# Patient Record
Sex: MALE | Race: WHITE | NOT HISPANIC OR LATINO | Employment: OTHER | URBAN - METROPOLITAN AREA
[De-identification: names, ages, dates, MRNs, and addresses within clinical notes are randomized per-mention and may not be internally consistent; named-entity substitution may affect disease eponyms.]

---

## 2019-01-09 ENCOUNTER — HOSPITAL ENCOUNTER (EMERGENCY)
Facility: HOSPITAL | Age: 44
Discharge: HOME | End: 2019-01-09
Attending: INTERNAL MEDICINE
Payer: COMMERCIAL

## 2019-01-09 VITALS
OXYGEN SATURATION: 96 % | RESPIRATION RATE: 20 BRPM | TEMPERATURE: 98 F | WEIGHT: 170 LBS | DIASTOLIC BLOOD PRESSURE: 79 MMHG | SYSTOLIC BLOOD PRESSURE: 140 MMHG | HEIGHT: 65 IN | HEART RATE: 91 BPM | BODY MASS INDEX: 28.32 KG/M2

## 2019-01-09 DIAGNOSIS — T14.90XA TRAUMA: ICD-10-CM

## 2019-01-09 DIAGNOSIS — T07.XXXA MULTIPLE SPRAINS: ICD-10-CM

## 2019-01-09 DIAGNOSIS — T07.XXXA MULTIPLE CONTUSIONS: Primary | ICD-10-CM

## 2019-01-09 PROCEDURE — 25000003 PHARM REV CODE 250: Performed by: INTERNAL MEDICINE

## 2019-01-09 PROCEDURE — 73080 X-RAY EXAM OF ELBOW: CPT | Mod: TC,FY,RT

## 2019-01-09 PROCEDURE — 99284 EMERGENCY DEPT VISIT MOD MDM: CPT | Mod: 25

## 2019-01-09 PROCEDURE — 73030 X-RAY EXAM OF SHOULDER: CPT | Mod: TC,FY,RT

## 2019-01-09 PROCEDURE — 73130 XR HAND COMPLETE 3 VIEW RIGHT: ICD-10-PCS | Mod: 26,RT,, | Performed by: RADIOLOGY

## 2019-01-09 PROCEDURE — 73130 X-RAY EXAM OF HAND: CPT | Mod: TC,FY,RT

## 2019-01-09 PROCEDURE — 73080 XR ELBOW COMPLETE 3 VIEW RIGHT: ICD-10-PCS | Mod: 26,RT,, | Performed by: RADIOLOGY

## 2019-01-09 PROCEDURE — 63600175 PHARM REV CODE 636 W HCPCS: Performed by: INTERNAL MEDICINE

## 2019-01-09 PROCEDURE — 72040 X-RAY EXAM NECK SPINE 2-3 VW: CPT | Mod: TC,FY

## 2019-01-09 PROCEDURE — 73030 X-RAY EXAM OF SHOULDER: CPT | Mod: 26,RT,, | Performed by: RADIOLOGY

## 2019-01-09 PROCEDURE — 72040 X-RAY EXAM NECK SPINE 2-3 VW: CPT | Mod: 26,,, | Performed by: RADIOLOGY

## 2019-01-09 PROCEDURE — 73130 X-RAY EXAM OF HAND: CPT | Mod: 26,RT,, | Performed by: RADIOLOGY

## 2019-01-09 PROCEDURE — 72040 XR CERVICAL SPINE AP LATERAL: ICD-10-PCS | Mod: 26,,, | Performed by: RADIOLOGY

## 2019-01-09 PROCEDURE — 71046 X-RAY EXAM CHEST 2 VIEWS: CPT | Mod: TC,FY

## 2019-01-09 PROCEDURE — 96372 THER/PROPH/DIAG INJ SC/IM: CPT

## 2019-01-09 PROCEDURE — 73030 XR SHOULDER TRAUMA 3 VIEW RIGHT: ICD-10-PCS | Mod: 26,RT,, | Performed by: RADIOLOGY

## 2019-01-09 PROCEDURE — 71046 X-RAY EXAM CHEST 2 VIEWS: CPT | Mod: 26,,, | Performed by: RADIOLOGY

## 2019-01-09 PROCEDURE — 71046 XR CHEST PA AND LATERAL: ICD-10-PCS | Mod: 26,,, | Performed by: RADIOLOGY

## 2019-01-09 PROCEDURE — 73080 X-RAY EXAM OF ELBOW: CPT | Mod: 26,RT,, | Performed by: RADIOLOGY

## 2019-01-09 RX ORDER — HYDROCODONE BITARTRATE AND ACETAMINOPHEN 10; 325 MG/1; MG/1
1 TABLET ORAL
Status: COMPLETED | OUTPATIENT
Start: 2019-01-09 | End: 2019-01-09

## 2019-01-09 RX ORDER — TIZANIDINE 4 MG/1
4 TABLET ORAL EVERY 6 HOURS PRN
Qty: 30 TABLET | Refills: 1 | Status: SHIPPED | OUTPATIENT
Start: 2019-01-09 | End: 2019-01-19

## 2019-01-09 RX ORDER — KETOROLAC TROMETHAMINE 30 MG/ML
60 INJECTION, SOLUTION INTRAMUSCULAR; INTRAVENOUS
Status: COMPLETED | OUTPATIENT
Start: 2019-01-09 | End: 2019-01-09

## 2019-01-09 RX ORDER — NAPROXEN 500 MG/1
500 TABLET ORAL 2 TIMES DAILY WITH MEALS
Qty: 60 TABLET | Refills: 0 | Status: SHIPPED | OUTPATIENT
Start: 2019-01-09 | End: 2019-03-28

## 2019-01-09 RX ADMIN — HYDROCODONE BITARTRATE AND ACETAMINOPHEN 1 TABLET: 10; 325 TABLET ORAL at 10:01

## 2019-01-09 RX ADMIN — KETOROLAC TROMETHAMINE 60 MG: 30 INJECTION, SOLUTION INTRAMUSCULAR at 10:01
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
## 2019-03-28 ENCOUNTER — ANESTHESIA (OUTPATIENT)
Dept: SURGERY | Facility: HOSPITAL | Age: 44
DRG: 854 | End: 2019-03-28

## 2019-03-28 ENCOUNTER — ANESTHESIA EVENT (OUTPATIENT)
Dept: SURGERY | Facility: HOSPITAL | Age: 44
DRG: 854 | End: 2019-03-28

## 2019-03-28 ENCOUNTER — HOSPITAL ENCOUNTER (EMERGENCY)
Facility: HOSPITAL | Age: 44
Discharge: ANOTHER HOSPITAL | End: 2019-03-28
Attending: FAMILY MEDICINE

## 2019-03-28 ENCOUNTER — HOSPITAL ENCOUNTER (INPATIENT)
Facility: HOSPITAL | Age: 44
LOS: 2 days | Discharge: LEFT BEFORE DISCHARGE | DRG: 854 | End: 2019-03-30
Attending: INTERNAL MEDICINE | Admitting: INTERNAL MEDICINE

## 2019-03-28 VITALS
OXYGEN SATURATION: 100 % | WEIGHT: 150 LBS | RESPIRATION RATE: 18 BRPM | SYSTOLIC BLOOD PRESSURE: 125 MMHG | BODY MASS INDEX: 28.32 KG/M2 | HEART RATE: 99 BPM | DIASTOLIC BLOOD PRESSURE: 79 MMHG | HEIGHT: 61 IN | TEMPERATURE: 101 F

## 2019-03-28 DIAGNOSIS — N20.1 URETERAL STONE: ICD-10-CM

## 2019-03-28 DIAGNOSIS — N13.2 URETERAL STONE WITH HYDRONEPHROSIS: Primary | ICD-10-CM

## 2019-03-28 DIAGNOSIS — A41.9 SEPSIS: ICD-10-CM

## 2019-03-28 DIAGNOSIS — N10 PYELONEPHRITIS, ACUTE: ICD-10-CM

## 2019-03-28 PROBLEM — F17.200 NICOTINE DEPENDENCE: Status: ACTIVE | Noted: 2019-03-28

## 2019-03-28 PROBLEM — E86.0 DEHYDRATION: Status: ACTIVE | Noted: 2019-03-28

## 2019-03-28 PROBLEM — E87.1 HYPONATREMIA: Status: ACTIVE | Noted: 2019-03-28

## 2019-03-28 PROBLEM — N12 PYELONEPHRITIS: Status: ACTIVE | Noted: 2019-03-28

## 2019-03-28 LAB
ALBUMIN SERPL BCP-MCNC: 3.9 G/DL (ref 3.5–5.2)
ALP SERPL-CCNC: 91 U/L (ref 55–135)
ALT SERPL W/O P-5'-P-CCNC: 38 U/L (ref 10–44)
AMPHET+METHAMPHET UR QL: NORMAL
ANION GAP SERPL CALC-SCNC: 11 MMOL/L (ref 8–16)
AST SERPL-CCNC: 35 U/L (ref 10–40)
BACTERIA #/AREA URNS HPF: ABNORMAL /HPF
BARBITURATES UR QL SCN>200 NG/ML: NEGATIVE
BASOPHILS # BLD AUTO: 0.04 K/UL (ref 0–0.2)
BASOPHILS NFR BLD: 0.2 % (ref 0–1.9)
BENZODIAZ UR QL SCN>200 NG/ML: NEGATIVE
BILIRUB SERPL-MCNC: 1.3 MG/DL (ref 0.1–1)
BILIRUB UR QL STRIP: ABNORMAL
BUN SERPL-MCNC: 11 MG/DL (ref 6–20)
BZE UR QL SCN: NEGATIVE
CALCIUM SERPL-MCNC: 9 MG/DL (ref 8.7–10.5)
CANNABINOIDS UR QL SCN: NORMAL
CHLORIDE SERPL-SCNC: 98 MMOL/L (ref 95–110)
CLARITY UR: ABNORMAL
CO2 SERPL-SCNC: 22 MMOL/L (ref 23–29)
COLOR UR: YELLOW
CREAT SERPL-MCNC: 1.4 MG/DL (ref 0.5–1.4)
CREAT UR-MCNC: 292 MG/DL (ref 23–375)
DEPRECATED S PYO AG THROAT QL EIA: NEGATIVE
DIFFERENTIAL METHOD: ABNORMAL
EOSINOPHIL # BLD AUTO: 0 K/UL (ref 0–0.5)
EOSINOPHIL NFR BLD: 0.2 % (ref 0–8)
ERYTHROCYTE [DISTWIDTH] IN BLOOD BY AUTOMATED COUNT: 13.4 % (ref 11.5–14.5)
EST. GFR  (AFRICAN AMERICAN): >60 ML/MIN/1.73 M^2
EST. GFR  (NON AFRICAN AMERICAN): >60 ML/MIN/1.73 M^2
GLUCOSE SERPL-MCNC: 113 MG/DL (ref 70–110)
GLUCOSE UR QL STRIP: NEGATIVE
HCT VFR BLD AUTO: 47.3 % (ref 40–54)
HGB BLD-MCNC: 15.6 G/DL (ref 14–18)
HGB UR QL STRIP: ABNORMAL
HYALINE CASTS #/AREA URNS LPF: 0 /LPF
IMM GRANULOCYTES # BLD AUTO: 0.13 K/UL (ref 0–0.04)
IMM GRANULOCYTES NFR BLD AUTO: 0.8 % (ref 0–0.5)
INFLUENZA A, MOLECULAR: NEGATIVE
INFLUENZA B, MOLECULAR: NEGATIVE
KETONES UR QL STRIP: ABNORMAL
LACTATE SERPL-SCNC: 0.7 MMOL/L (ref 0.5–2.2)
LEUKOCYTE ESTERASE UR QL STRIP: ABNORMAL
LYMPHOCYTES # BLD AUTO: 1.6 K/UL (ref 1–4.8)
LYMPHOCYTES NFR BLD: 9.7 % (ref 18–48)
MCH RBC QN AUTO: 31 PG (ref 27–31)
MCHC RBC AUTO-ENTMCNC: 33 G/DL (ref 32–36)
MCV RBC AUTO: 94 FL (ref 82–98)
MICROSCOPIC COMMENT: ABNORMAL
MONOCYTES # BLD AUTO: 0.9 K/UL (ref 0.3–1)
MONOCYTES NFR BLD: 5.7 % (ref 4–15)
NEUTROPHILS # BLD AUTO: 13.7 K/UL (ref 1.8–7.7)
NEUTROPHILS NFR BLD: 83.4 % (ref 38–73)
NITRITE UR QL STRIP: POSITIVE
NRBC BLD-RTO: 0 /100 WBC
OPIATES UR QL SCN: NEGATIVE
PCP UR QL SCN>25 NG/ML: NEGATIVE
PH UR STRIP: 6 [PH] (ref 5–8)
PLATELET # BLD AUTO: 239 K/UL (ref 150–350)
PMV BLD AUTO: 9.1 FL (ref 9.2–12.9)
POTASSIUM SERPL-SCNC: 4.2 MMOL/L (ref 3.5–5.1)
PROT SERPL-MCNC: 7.7 G/DL (ref 6–8.4)
PROT UR QL STRIP: ABNORMAL
RBC # BLD AUTO: 5.03 M/UL (ref 4.6–6.2)
RBC #/AREA URNS HPF: 20 /HPF (ref 0–4)
SODIUM SERPL-SCNC: 131 MMOL/L (ref 136–145)
SP GR UR STRIP: >=1.03 (ref 1–1.03)
SPECIMEN SOURCE: NORMAL
TOXICOLOGY INFORMATION: NORMAL
URN SPEC COLLECT METH UR: ABNORMAL
UROBILINOGEN UR STRIP-ACNC: >=8 EU/DL
WBC # BLD AUTO: 16.37 K/UL (ref 3.9–12.7)
WBC #/AREA URNS HPF: 50 /HPF (ref 0–5)

## 2019-03-28 PROCEDURE — D9220A PRA ANESTHESIA: ICD-10-PCS | Mod: ,,, | Performed by: ANESTHESIOLOGY

## 2019-03-28 PROCEDURE — 71000033 HC RECOVERY, INTIAL HOUR: Performed by: UROLOGY

## 2019-03-28 PROCEDURE — 63600175 PHARM REV CODE 636 W HCPCS: Performed by: INTERNAL MEDICINE

## 2019-03-28 PROCEDURE — 63600175 PHARM REV CODE 636 W HCPCS: Performed by: NURSE PRACTITIONER

## 2019-03-28 PROCEDURE — 37000009 HC ANESTHESIA EA ADD 15 MINS: Performed by: UROLOGY

## 2019-03-28 PROCEDURE — 63600175 PHARM REV CODE 636 W HCPCS: Performed by: FAMILY MEDICINE

## 2019-03-28 PROCEDURE — 87880 STREP A ASSAY W/OPTIC: CPT

## 2019-03-28 PROCEDURE — 96374 THER/PROPH/DIAG INJ IV PUSH: CPT | Performed by: INTERNAL MEDICINE

## 2019-03-28 PROCEDURE — 96365 THER/PROPH/DIAG IV INF INIT: CPT

## 2019-03-28 PROCEDURE — 76000 FLUOROSCOPY <1 HR PHYS/QHP: CPT | Mod: 26,59,, | Performed by: UROLOGY

## 2019-03-28 PROCEDURE — 99255 PR INITIAL INPATIENT CONSULT,LEVL V: ICD-10-PCS | Mod: 25,,, | Performed by: UROLOGY

## 2019-03-28 PROCEDURE — 52332 PR CYSTOSCOPY,INSERT URETERAL STENT: ICD-10-PCS | Mod: LT,,, | Performed by: UROLOGY

## 2019-03-28 PROCEDURE — 87088 URINE BACTERIA CULTURE: CPT

## 2019-03-28 PROCEDURE — 63600175 PHARM REV CODE 636 W HCPCS: Performed by: NURSE ANESTHETIST, CERTIFIED REGISTERED

## 2019-03-28 PROCEDURE — 74176 CT RENAL STONE STUDY ABD PELVIS WO: ICD-10-PCS | Mod: 26,,, | Performed by: RADIOLOGY

## 2019-03-28 PROCEDURE — 87502 INFLUENZA DNA AMP PROBE: CPT

## 2019-03-28 PROCEDURE — 87040 BLOOD CULTURE FOR BACTERIA: CPT

## 2019-03-28 PROCEDURE — 74420 UROGRAPHY RTRGR +-KUB: CPT | Mod: 26,,, | Performed by: UROLOGY

## 2019-03-28 PROCEDURE — 87186 SC STD MICRODIL/AGAR DIL: CPT

## 2019-03-28 PROCEDURE — 99285 EMERGENCY DEPT VISIT HI MDM: CPT | Mod: 25

## 2019-03-28 PROCEDURE — 36000706: Performed by: UROLOGY

## 2019-03-28 PROCEDURE — 25000003 PHARM REV CODE 250: Performed by: NURSE PRACTITIONER

## 2019-03-28 PROCEDURE — 71000039 HC RECOVERY, EACH ADD'L HOUR: Performed by: UROLOGY

## 2019-03-28 PROCEDURE — 36415 COLL VENOUS BLD VENIPUNCTURE: CPT

## 2019-03-28 PROCEDURE — 76000 PR  FLUOROSCOPE EXAMINATION: ICD-10-PCS | Mod: 26,59,, | Performed by: UROLOGY

## 2019-03-28 PROCEDURE — 80307 DRUG TEST PRSMV CHEM ANLYZR: CPT

## 2019-03-28 PROCEDURE — 85025 COMPLETE CBC W/AUTO DIFF WBC: CPT

## 2019-03-28 PROCEDURE — 74176 CT ABD & PELVIS W/O CONTRAST: CPT | Mod: 26,,, | Performed by: RADIOLOGY

## 2019-03-28 PROCEDURE — C1758 CATHETER, URETERAL: HCPCS | Performed by: UROLOGY

## 2019-03-28 PROCEDURE — 25000003 PHARM REV CODE 250: Performed by: FAMILY MEDICINE

## 2019-03-28 PROCEDURE — 27200651 HC AIRWAY, LMA: Performed by: NURSE ANESTHETIST, CERTIFIED REGISTERED

## 2019-03-28 PROCEDURE — C1769 GUIDE WIRE: HCPCS | Performed by: UROLOGY

## 2019-03-28 PROCEDURE — 11000001 HC ACUTE MED/SURG PRIVATE ROOM

## 2019-03-28 PROCEDURE — 80053 COMPREHEN METABOLIC PANEL: CPT

## 2019-03-28 PROCEDURE — 63600175 PHARM REV CODE 636 W HCPCS: Performed by: UROLOGY

## 2019-03-28 PROCEDURE — 36000707: Performed by: UROLOGY

## 2019-03-28 PROCEDURE — 25000003 PHARM REV CODE 250: Performed by: UROLOGY

## 2019-03-28 PROCEDURE — 87081 CULTURE SCREEN ONLY: CPT

## 2019-03-28 PROCEDURE — 96376 TX/PRO/DX INJ SAME DRUG ADON: CPT

## 2019-03-28 PROCEDURE — D9220A PRA ANESTHESIA: Mod: ,,, | Performed by: ANESTHESIOLOGY

## 2019-03-28 PROCEDURE — 96366 THER/PROPH/DIAG IV INF ADDON: CPT

## 2019-03-28 PROCEDURE — 25500020 PHARM REV CODE 255: Performed by: UROLOGY

## 2019-03-28 PROCEDURE — 96375 TX/PRO/DX INJ NEW DRUG ADDON: CPT

## 2019-03-28 PROCEDURE — 52332 CYSTOSCOPY AND TREATMENT: CPT | Mod: LT,,, | Performed by: UROLOGY

## 2019-03-28 PROCEDURE — 25000003 PHARM REV CODE 250: Performed by: NURSE ANESTHETIST, CERTIFIED REGISTERED

## 2019-03-28 PROCEDURE — 37000008 HC ANESTHESIA 1ST 15 MINUTES: Performed by: UROLOGY

## 2019-03-28 PROCEDURE — 96361 HYDRATE IV INFUSION ADD-ON: CPT

## 2019-03-28 PROCEDURE — 87077 CULTURE AEROBIC IDENTIFY: CPT

## 2019-03-28 PROCEDURE — 99900103 DSU ONLY-NO CHARGE-INITIAL HR (STAT): Performed by: UROLOGY

## 2019-03-28 PROCEDURE — 87086 URINE CULTURE/COLONY COUNT: CPT

## 2019-03-28 PROCEDURE — 25000242 PHARM REV CODE 250 ALT 637 W/ HCPCS: Performed by: NURSE ANESTHETIST, CERTIFIED REGISTERED

## 2019-03-28 PROCEDURE — 81000 URINALYSIS NONAUTO W/SCOPE: CPT | Mod: 59

## 2019-03-28 PROCEDURE — 74420 PR  X-RAY RETROGRADE PYELOGRAM: ICD-10-PCS | Mod: 26,,, | Performed by: UROLOGY

## 2019-03-28 PROCEDURE — 63600175 PHARM REV CODE 636 W HCPCS: Performed by: ANESTHESIOLOGY

## 2019-03-28 PROCEDURE — C2617 STENT, NON-COR, TEM W/O DEL: HCPCS | Performed by: UROLOGY

## 2019-03-28 PROCEDURE — 99255 IP/OBS CONSLTJ NEW/EST HI 80: CPT | Mod: 25,,, | Performed by: UROLOGY

## 2019-03-28 PROCEDURE — 83605 ASSAY OF LACTIC ACID: CPT

## 2019-03-28 PROCEDURE — 74176 CT ABD & PELVIS W/O CONTRAST: CPT | Mod: TC

## 2019-03-28 DEVICE — STENT SET URETERAL 6X24CM
Type: IMPLANTABLE DEVICE | Site: URETER | Status: NON-FUNCTIONAL
Removed: 2019-04-25

## 2019-03-28 RX ORDER — ONDANSETRON 2 MG/ML
INJECTION INTRAMUSCULAR; INTRAVENOUS
Status: DISCONTINUED | OUTPATIENT
Start: 2019-03-28 | End: 2019-03-28

## 2019-03-28 RX ORDER — GLYCOPYRROLATE 0.2 MG/ML
INJECTION INTRAMUSCULAR; INTRAVENOUS
Status: DISCONTINUED | OUTPATIENT
Start: 2019-03-28 | End: 2019-03-28

## 2019-03-28 RX ORDER — SODIUM CHLORIDE 9 MG/ML
INJECTION, SOLUTION INTRAVENOUS CONTINUOUS
Status: DISCONTINUED | OUTPATIENT
Start: 2019-03-28 | End: 2019-03-30 | Stop reason: HOSPADM

## 2019-03-28 RX ORDER — LEVOFLOXACIN 5 MG/ML
750 INJECTION, SOLUTION INTRAVENOUS
Status: DISCONTINUED | OUTPATIENT
Start: 2019-03-28 | End: 2019-03-28

## 2019-03-28 RX ORDER — RAMELTEON 8 MG/1
8 TABLET ORAL NIGHTLY PRN
Status: DISCONTINUED | OUTPATIENT
Start: 2019-03-28 | End: 2019-03-30 | Stop reason: HOSPADM

## 2019-03-28 RX ORDER — LIDOCAINE HCL/PF 100 MG/5ML
SYRINGE (ML) INTRAVENOUS
Status: DISCONTINUED | OUTPATIENT
Start: 2019-03-28 | End: 2019-03-28

## 2019-03-28 RX ORDER — LORAZEPAM 2 MG/ML
0.5 INJECTION INTRAMUSCULAR
Status: DISCONTINUED | OUTPATIENT
Start: 2019-03-28 | End: 2019-03-28

## 2019-03-28 RX ORDER — SODIUM CHLORIDE 0.9 % (FLUSH) 0.9 %
10 SYRINGE (ML) INJECTION
Status: DISCONTINUED | OUTPATIENT
Start: 2019-03-28 | End: 2019-03-30 | Stop reason: HOSPADM

## 2019-03-28 RX ORDER — ACETAMINOPHEN 500 MG
1000 TABLET ORAL EVERY 6 HOURS PRN
Status: DISCONTINUED | OUTPATIENT
Start: 2019-03-28 | End: 2019-03-30 | Stop reason: HOSPADM

## 2019-03-28 RX ORDER — POLYETHYLENE GLYCOL 3350 17 G/17G
17 POWDER, FOR SOLUTION ORAL 2 TIMES DAILY PRN
Status: DISCONTINUED | OUTPATIENT
Start: 2019-03-28 | End: 2019-03-30 | Stop reason: HOSPADM

## 2019-03-28 RX ORDER — HYDROMORPHONE HYDROCHLORIDE 2 MG/ML
0.5 INJECTION, SOLUTION INTRAMUSCULAR; INTRAVENOUS; SUBCUTANEOUS
Status: COMPLETED | OUTPATIENT
Start: 2019-03-28 | End: 2019-03-28

## 2019-03-28 RX ORDER — HYDROMORPHONE HYDROCHLORIDE 2 MG/ML
1 INJECTION, SOLUTION INTRAMUSCULAR; INTRAVENOUS; SUBCUTANEOUS
Status: COMPLETED | OUTPATIENT
Start: 2019-03-28 | End: 2019-03-28

## 2019-03-28 RX ORDER — ACETAMINOPHEN 10 MG/ML
1000 INJECTION, SOLUTION INTRAVENOUS ONCE
Status: COMPLETED | OUTPATIENT
Start: 2019-03-28 | End: 2019-03-28

## 2019-03-28 RX ORDER — SODIUM CHLORIDE, SODIUM LACTATE, POTASSIUM CHLORIDE, CALCIUM CHLORIDE 600; 310; 30; 20 MG/100ML; MG/100ML; MG/100ML; MG/100ML
INJECTION, SOLUTION INTRAVENOUS CONTINUOUS
Status: DISCONTINUED | OUTPATIENT
Start: 2019-03-28 | End: 2019-03-29

## 2019-03-28 RX ORDER — ONDANSETRON 2 MG/ML
4 INJECTION INTRAMUSCULAR; INTRAVENOUS
Status: COMPLETED | OUTPATIENT
Start: 2019-03-28 | End: 2019-03-28

## 2019-03-28 RX ORDER — FENTANYL CITRATE 50 UG/ML
INJECTION, SOLUTION INTRAMUSCULAR; INTRAVENOUS
Status: DISCONTINUED | OUTPATIENT
Start: 2019-03-28 | End: 2019-03-28

## 2019-03-28 RX ORDER — KETAMINE HYDROCHLORIDE 100 MG/ML
INJECTION, SOLUTION INTRAMUSCULAR; INTRAVENOUS
Status: DISCONTINUED | OUTPATIENT
Start: 2019-03-28 | End: 2019-03-28

## 2019-03-28 RX ORDER — ACETAMINOPHEN 10 MG/ML
1000 INJECTION, SOLUTION INTRAVENOUS ONCE
Status: DISCONTINUED | OUTPATIENT
Start: 2019-03-28 | End: 2019-03-28

## 2019-03-28 RX ORDER — KETOROLAC TROMETHAMINE 30 MG/ML
15 INJECTION, SOLUTION INTRAMUSCULAR; INTRAVENOUS EVERY 6 HOURS PRN
Status: DISCONTINUED | OUTPATIENT
Start: 2019-03-28 | End: 2019-03-30 | Stop reason: HOSPADM

## 2019-03-28 RX ORDER — MEROPENEM AND SODIUM CHLORIDE 1 G/50ML
1 INJECTION, SOLUTION INTRAVENOUS
Status: COMPLETED | OUTPATIENT
Start: 2019-03-28 | End: 2019-03-28

## 2019-03-28 RX ORDER — TAMSULOSIN HYDROCHLORIDE 0.4 MG/1
0.4 CAPSULE ORAL NIGHTLY
Status: DISCONTINUED | OUTPATIENT
Start: 2019-03-28 | End: 2019-03-30 | Stop reason: HOSPADM

## 2019-03-28 RX ORDER — PROPOFOL 10 MG/ML
VIAL (ML) INTRAVENOUS
Status: DISCONTINUED | OUTPATIENT
Start: 2019-03-28 | End: 2019-03-28

## 2019-03-28 RX ORDER — ACETAMINOPHEN 650 MG/1
650 SUPPOSITORY RECTAL
Status: DISCONTINUED | OUTPATIENT
Start: 2019-03-28 | End: 2019-03-28 | Stop reason: HOSPADM

## 2019-03-28 RX ORDER — FENTANYL CITRATE 50 UG/ML
50 INJECTION, SOLUTION INTRAMUSCULAR; INTRAVENOUS EVERY 5 MIN PRN
Status: DISCONTINUED | OUTPATIENT
Start: 2019-03-28 | End: 2019-03-30 | Stop reason: HOSPADM

## 2019-03-28 RX ORDER — DEXAMETHASONE SODIUM PHOSPHATE 4 MG/ML
INJECTION, SOLUTION INTRA-ARTICULAR; INTRALESIONAL; INTRAMUSCULAR; INTRAVENOUS; SOFT TISSUE
Status: DISCONTINUED | OUTPATIENT
Start: 2019-03-28 | End: 2019-03-28

## 2019-03-28 RX ORDER — ENOXAPARIN SODIUM 100 MG/ML
40 INJECTION SUBCUTANEOUS EVERY 24 HOURS
Status: DISCONTINUED | OUTPATIENT
Start: 2019-03-28 | End: 2019-03-29

## 2019-03-28 RX ORDER — ONDANSETRON 2 MG/ML
4 INJECTION INTRAMUSCULAR; INTRAVENOUS EVERY 4 HOURS PRN
Status: DISCONTINUED | OUTPATIENT
Start: 2019-03-28 | End: 2019-03-30 | Stop reason: HOSPADM

## 2019-03-28 RX ORDER — VANCOMYCIN HCL IN 5 % DEXTROSE 1G/250ML
1000 PLASTIC BAG, INJECTION (ML) INTRAVENOUS
Status: DISCONTINUED | OUTPATIENT
Start: 2019-03-29 | End: 2019-03-29

## 2019-03-28 RX ORDER — MIDAZOLAM HYDROCHLORIDE 1 MG/ML
INJECTION, SOLUTION INTRAMUSCULAR; INTRAVENOUS
Status: DISCONTINUED | OUTPATIENT
Start: 2019-03-28 | End: 2019-03-28

## 2019-03-28 RX ORDER — HYDROMORPHONE HYDROCHLORIDE 2 MG/ML
0.2 INJECTION, SOLUTION INTRAMUSCULAR; INTRAVENOUS; SUBCUTANEOUS EVERY 5 MIN PRN
Status: DISCONTINUED | OUTPATIENT
Start: 2019-03-28 | End: 2019-03-28

## 2019-03-28 RX ORDER — SODIUM CHLORIDE, SODIUM LACTATE, POTASSIUM CHLORIDE, CALCIUM CHLORIDE 600; 310; 30; 20 MG/100ML; MG/100ML; MG/100ML; MG/100ML
INJECTION, SOLUTION INTRAVENOUS CONTINUOUS PRN
Status: DISCONTINUED | OUTPATIENT
Start: 2019-03-28 | End: 2019-03-28

## 2019-03-28 RX ORDER — IBUPROFEN 200 MG
1 TABLET ORAL DAILY
Status: DISCONTINUED | OUTPATIENT
Start: 2019-03-29 | End: 2019-03-29

## 2019-03-28 RX ORDER — VANCOMYCIN HCL IN 5 % DEXTROSE 1G/250ML
1000 PLASTIC BAG, INJECTION (ML) INTRAVENOUS
Status: DISCONTINUED | OUTPATIENT
Start: 2019-03-28 | End: 2019-03-28 | Stop reason: HOSPADM

## 2019-03-28 RX ADMIN — DEXAMETHASONE SODIUM PHOSPHATE 4 MG: 4 INJECTION, SOLUTION INTRAMUSCULAR; INTRAVENOUS at 05:03

## 2019-03-28 RX ADMIN — PIPERACILLIN SODIUM AND TAZOBACTAM SODIUM 4.5 G: 4; .5 INJECTION, POWDER, LYOPHILIZED, FOR SOLUTION INTRAVENOUS at 09:03

## 2019-03-28 RX ADMIN — HYDROMORPHONE HYDROCHLORIDE 0.2 MG: 2 INJECTION, SOLUTION INTRAMUSCULAR; INTRAVENOUS; SUBCUTANEOUS at 07:03

## 2019-03-28 RX ADMIN — HYDROMORPHONE HYDROCHLORIDE 0.2 MG: 2 INJECTION, SOLUTION INTRAMUSCULAR; INTRAVENOUS; SUBCUTANEOUS at 06:03

## 2019-03-28 RX ADMIN — PROPOFOL 200 MG: 10 INJECTION, EMULSION INTRAVENOUS at 05:03

## 2019-03-28 RX ADMIN — KETAMINE HYDROCHLORIDE 30 MG: 100 INJECTION, SOLUTION, CONCENTRATE INTRAMUSCULAR; INTRAVENOUS at 05:03

## 2019-03-28 RX ADMIN — SODIUM CHLORIDE, SODIUM LACTATE, POTASSIUM CHLORIDE, AND CALCIUM CHLORIDE: .6; .31; .03; .02 INJECTION, SOLUTION INTRAVENOUS at 05:03

## 2019-03-28 RX ADMIN — SODIUM CHLORIDE, SODIUM LACTATE, POTASSIUM CHLORIDE, AND CALCIUM CHLORIDE: .6; .31; .03; .02 INJECTION, SOLUTION INTRAVENOUS at 06:03

## 2019-03-28 RX ADMIN — ONDANSETRON 4 MG: 2 INJECTION, SOLUTION INTRAMUSCULAR; INTRAVENOUS at 05:03

## 2019-03-28 RX ADMIN — LIDOCAINE HYDROCHLORIDE 100 MG: 20 INJECTION, SOLUTION INTRAVENOUS at 05:03

## 2019-03-28 RX ADMIN — ACETAMINOPHEN 1000 MG: 10 INJECTION, SOLUTION INTRAVENOUS at 05:03

## 2019-03-28 RX ADMIN — IPRATROPIUM BROMIDE AND ALBUTEROL 5 PUFF: 20; 100 SPRAY, METERED RESPIRATORY (INHALATION) at 06:03

## 2019-03-28 RX ADMIN — MEROPENEM AND SODIUM CHLORIDE 1 G: 1 INJECTION, SOLUTION INTRAVENOUS at 03:03

## 2019-03-28 RX ADMIN — HYDROMORPHONE HYDROCHLORIDE 0.5 MG: 2 INJECTION, SOLUTION INTRAMUSCULAR; INTRAVENOUS; SUBCUTANEOUS at 02:03

## 2019-03-28 RX ADMIN — LEVOFLOXACIN 750 MG: 750 INJECTION, SOLUTION INTRAVENOUS at 02:03

## 2019-03-28 RX ADMIN — FENTANYL CITRATE 50 MCG: 50 INJECTION, SOLUTION INTRAMUSCULAR; INTRAVENOUS at 05:03

## 2019-03-28 RX ADMIN — LORAZEPAM 0.5 MG: 2 INJECTION, SOLUTION INTRAMUSCULAR; INTRAVENOUS at 06:03

## 2019-03-28 RX ADMIN — SODIUM CHLORIDE 1000 ML: 9 INJECTION, SOLUTION INTRAVENOUS at 10:03

## 2019-03-28 RX ADMIN — SODIUM CHLORIDE: 0.9 INJECTION, SOLUTION INTRAVENOUS at 07:03

## 2019-03-28 RX ADMIN — ONDANSETRON 4 MG: 2 INJECTION INTRAMUSCULAR; INTRAVENOUS at 10:03

## 2019-03-28 RX ADMIN — GLYCOPYRROLATE 0.2 MG: 0.2 INJECTION, SOLUTION INTRAMUSCULAR; INTRAVENOUS at 05:03

## 2019-03-28 RX ADMIN — MIDAZOLAM 2 MG: 1 INJECTION INTRAMUSCULAR; INTRAVENOUS at 05:03

## 2019-03-28 RX ADMIN — HYDROMORPHONE HYDROCHLORIDE 1 MG: 2 INJECTION INTRAMUSCULAR; INTRAVENOUS; SUBCUTANEOUS at 10:03

## 2019-03-28 RX ADMIN — VANCOMYCIN HYDROCHLORIDE 1000 MG: 1 INJECTION, POWDER, LYOPHILIZED, FOR SOLUTION INTRAVENOUS at 03:03

## 2019-03-28 NOTE — ED TRIAGE NOTES
"" kidney stone" reports Hx: kidney stone, reports " number 14" symptoms x 2 days ago, reports L malaika pain and L lower back pain,   "

## 2019-03-28 NOTE — PROGRESS NOTES
Outside Transfer Acceptance Note     Patients name: Jacky Ray     Transferring Physician or Mid-Level provider/Clinic giving report: Dylan Caicedo MD (ED Physician [facility])    Accepting Physician for admission to hospital: Kristel Blum MD      Date of acceptance:  03/28/2019    Allergies: Tetanus     Reason for transfer: Infected Ureteral Nephrolithiasis with need for stent     HPI: This is a 44 year old male with renal stones who presents with severe left flank pain. CT of the abdomen for renal stones was obtained which showed moderate left-sided hydronephrosis secondary to a prominent partially obstructing left ureteropelvic junction calculus.  There was also left perinephric inflammatory change.  Urology is unavailable at [place] currently and so the patient will transfer for intervention.  The patient was given a dose of Levaquin 750 mg x1.    VS:  /76, pulse 95, respirations 18, temp 98.4F°, O2 sats 99% on room air    Labs: WBCs 16.4, urinalysis reveals specific gravity >1.03, protein 2+, occult blood 2+, positive nitrites, 1+ leukocytes, 20 RBCs, and 50 WBCs with moderate bacteria    Radiographs:  As above    To Do List upon arrival:  1.  Consult Urology                                              2. Zosyn IV (follow up blood and urine                                                   cultures)                                             3. NPO status for now

## 2019-03-28 NOTE — TRANSFER OF CARE
"Anesthesia Transfer of Care Note    Patient: Jacky Ray    Procedure(s) Performed: Procedure(s) (LRB):  CYSTOSCOPY, WITH URETERAL STENT INSERTION (Left)    Patient location: PACU    Anesthesia Type: general    Transport from OR: Transported from OR on 2-3 L/min O2 by NC with adequate spontaneous ventilation    Post pain: adequate analgesia    Post assessment: no apparent anesthetic complications    Post vital signs: stable    Level of consciousness: awake    Nausea/Vomiting: no nausea/vomiting    Complications: none    Transfer of care protocol was followed      Last vitals:   Visit Vitals  /66 (BP Location: Left arm, Patient Position: Lying)   Pulse (!) 115   Temp (!) 39.7 °C (103.4 °F) (Skin)   Resp 20   Ht 5' 4.5" (1.638 m)   Wt 66.2 kg (146 lb)   SpO2 (!) 93%   BMI 24.67 kg/m²     "

## 2019-03-28 NOTE — PROGRESS NOTES
Patient with 103 fever. Patient discussed with Dr. Hernandez.  Will continue vancomycin and add IV Zosyn.

## 2019-03-28 NOTE — ED NOTES
Informed nothing to eat or drink, no ice chips, no water, no food, nothing by mouth, informed surgery consult pending and if he eats or drinks anything, they will not operate, pt verbalized understanding

## 2019-03-28 NOTE — ASSESSMENT & PLAN NOTE
And pyelonephritis with sepsis-  Add IV fluids  Check lactic acid level  Blood cultures x2 and urine culture currently pending  Antibiotics discussed with Dr. Hernandez to request patient be continued on vancomycin and placed on Zosyn  Patient taken now for urgent surgical intervention
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
## 2019-03-28 NOTE — PROGRESS NOTES
Pt rec'd to pre op bay 5 from [place] ED.  [facility]  Crying in pain, alert and oriented, cooperative.  Dr Hernandez at bedside and spoke with pt, consent obtained.  No family present.  Belongings labeled w/pt's name and brought to pacu.  Phoebe Reis NP also at bedside and spoke with pt.

## 2019-03-28 NOTE — ASSESSMENT & PLAN NOTE
This patient does have evidence of infective focus  My overall impression is sepsis.  Antibiotics given-     The patient originally received IV vancomycin and Levaquin    Patient to be continued on IV vancomycin and Zosyn    Latest lactate reviewed, they are-  Lactic acid level ordered stat    Source- ureteral stone  Source control Achieved by- surgical intervention, IV antibiotics

## 2019-03-28 NOTE — CONSULTS
Jacky Ray [id number] is a 44 y.o. male who has been consulted for vancomycin dosing.    The patient has the following labs:     Date Creatinine (mg/dl)    BUN WBC Count   3/28/2019 Estimated Creatinine Clearance: 56.9 mL/min (based on SCr of 1.4 mg/dL). Lab Results   Component Value Date    BUN 11 03/28/2019     Lab Results   Component Value Date    WBC 16.37 (H) 03/28/2019        Current weight is 66.2 kg (146 lb)    The patient received  1000 mg on 03/28 at 1554 (if pt has already received first dose including doses in ED)    The patient will be started on vancomycin at a dose of 1000 mg every 24 hours (15 mg/kg/dose).  The vancomycin trough has been ordered for 03/30 at 1530.  Target trough goal is 10 to 15 mg/dL for UTI with stone.    Patient will be followed by pharmacy for changes in renal function, toxicity, and efficacy.   Thank you for allowing us to participate in this patient's care.     Leonel Degroot, PharmD

## 2019-03-28 NOTE — ANESTHESIA PREPROCEDURE EVALUATION
03/28/2019  Jacky Ray is a 44 y.o., male.    Anesthesia Evaluation      I have reviewed the Medications.     Review of Systems  Anesthesia Hx:  No problems with previous Anesthesia   Social:  Smoker, Alcohol Use 1 ppd tobacco, frequent marijuana, fifth of liquor weekly   EENT/Dental:   Feels like all of his teeth are loose   Cardiovascular:  Cardiovascular Normal     Pulmonary:  Pulmonary Normal    Renal/:   renal calculi    Hepatic/GI:  Hepatic/GI Normal    Neurological:  Neurology Normal    Endocrine:  Endocrine Normal        Physical Exam  General:  Well nourished In acute distress    Airway/Jaw/Neck:  Airway Findings: Mouth Opening: Normal Tongue: Normal  General Airway Assessment: Adult, Average  Mallampati: II  Jaw/Neck Findings:  Neck ROM: Normal ROM       Chest/Lungs:  Chest/Lungs Findings: Clear to auscultation, Normal Respiratory Rate     Heart/Vascular:  Heart Findings: Rate: Normal  Rhythm: Regular Rhythm  Sounds: Normal  Heart murmur: negative       Mental Status:  Mental Status Findings:  Cooperative, Alert and Oriented         Anesthesia Plan  Type of Anesthesia, risks & benefits discussed:  Anesthesia Type:  general  Patient's Preference:   Intra-op Monitoring Plan:   Intra-op Monitoring Plan Comments:   Post Op Pain Control Plan:   Post Op Pain Control Plan Comments:   Induction:   IV  Beta Blocker:  Patient is not currently on a Beta-Blocker (No further documentation required).       Informed Consent: Patient understands risks and agrees with Anesthesia plan.  Questions answered. Anesthesia consent signed with patient.  ASA Score: 2  emergent   Day of Surgery Review of History & Physical:            Ready For Surgery From Anesthesia Perspective.

## 2019-03-28 NOTE — H&P
"[facility] Medicine  History & Physical    Patient Name: Jacky Ray  MRN: [id number]  Admission Date: 3/28/2019  Attending Physician: Herber Wiggins MD   Primary Care Provider: Primary Doctor No    Patient information was obtained from patient and records available    Subjective:     Principal Problem:Sepsis    Chief Complaint:  Ureteral stone with hydronephrosis and obstruction, pyelonephritis with sepsis     HPI: This is a 44 year old male with long history of prior kidney stones at least 14"  per patient who initially presented to [facility] with severe left flank pain. A CT scan of the abdomen for renal stones was obtained and showed moderate left-sided hydronephrosis secondary to a prominent partially obstructing left ureteropelvic junction calculus.  There was also left perinephric inflammatory change.  Urology  was currently unavailable at [place], so the patient was transferred here to [facility] [place] for urological consultation and intervention.      Upon arriving here at [facility], the patient was noted to have 103 fever with chills.  He received a urological consultation and was brought immediately to the OR for intervention.  Blood cultures x2 and urine culture were previously sent to the lab at transferring facility.  We will check a stat lactic acid level and start IV fluids.    Patient discussed with Dr. Hernandez- will continue IV vancomycin and start IV Zosyn for coverage.        Past Medical History:   Diagnosis Date    Renal disorder     KIDNEY STONES       Past Surgical History:   Procedure Laterality Date    ORIF RADIUS & ULNA FRACTURES         Review of patient's allergies indicates:   Allergen Reactions    Tetanus vaccines and toxoid Swelling       Current Facility-Administered Medications on File Prior to Encounter   Medication    [COMPLETED] hydromorphone (PF) injection 0.5 mg    [COMPLETED] hydromorphone (PF) " injection 1 mg    [COMPLETED] meropenem-0.9% sodium chloride 1 g/50 mL IVPB    [COMPLETED] ondansetron injection 4 mg    [COMPLETED] sodium chloride 0.9% bolus 1,000 mL    [DISCONTINUED] acetaminophen suppository 650 mg    [COMPLETED] levoFLOXacin 750 mg/150 mL IVPB 750 mg    [DISCONTINUED] vancomycin (VANCOCIN) 1,020 mg in sodium chloride 0.45% IV syringe (Conc: 5 mg/ml)    [COMPLETED] vancomycin in dextrose 5 % 1 gram/250 mL IVPB 1,000 mg     Current Outpatient Medications on File Prior to Encounter   Medication Sig    [DISCONTINUED] naproxen (NAPROSYN) 500 MG tablet Take 1 tablet (500 mg total) by mouth 2 (two) times daily with meals.     Family History     None        Tobacco Use    Smoking status: Current Every Day Smoker     Packs/day: 1.00     Types: Cigarettes   Substance and Sexual Activity    Alcohol use: Yes     Comment: OCCASIONAL    Drug use: Yes     Types: Marijuana    Sexual activity: Not on file     Review of Systems   Constitutional: Positive for activity change, appetite change, chills, diaphoresis, fatigue and fever.   HENT: Negative for ear discharge, ear pain and facial swelling.    Eyes: Negative for pain and redness.   Respiratory: Negative for cough and shortness of breath.    Cardiovascular: Negative for chest pain, palpitations and leg swelling.   Gastrointestinal: Positive for abdominal pain and nausea. Negative for abdominal distention, constipation, diarrhea and vomiting.        Left lower abdominal   Endocrine: Negative for polydipsia and polyphagia.   Genitourinary: Positive for flank pain.        Left flank pain   Musculoskeletal: Negative for neck pain and neck stiffness.   Skin: Negative for color change.   Allergic/Immunologic: Negative for food allergies.   Neurological: Negative for seizures, syncope, facial asymmetry, speech difficulty and weakness.   Hematological: Does not bruise/bleed easily.   Psychiatric/Behavioral: Negative for agitation, behavioral problems,  confusion, hallucinations and suicidal ideas. The patient is nervous/anxious.      Objective:     Vital Signs (Most Recent):  Temp: (!) 103.4 °F (39.7 °C) (03/28/19 1716)  Pulse: (!) 115 (03/28/19 1716)  Resp: 20 (03/28/19 1716)  BP: 132/66 (03/28/19 1716)  SpO2: (!) 93 % (03/28/19 1716) Vital Signs (24h Range):  Temp:  [99.3 °F (37.4 °C)-103.4 °F (39.7 °C)] 103.4 °F (39.7 °C)  Pulse:  [] 115  Resp:  [17-22] 20  SpO2:  [93 %-100 %] 93 %  BP: (116-142)/(65-82) 132/66     Weight: 66.2 kg (146 lb)  Body mass index is 24.67 kg/m².    Physical Exam   Constitutional: He is oriented to person, place, and time. He appears well-developed and well-nourished. He appears distressed.   Patient having chills and significant flank pain   HENT:   Head: Normocephalic and atraumatic.   Eyes: Pupils are equal, round, and reactive to light. Conjunctivae and EOM are normal. Right eye exhibits no discharge. Left eye exhibits no discharge.   Neck: Normal range of motion. Neck supple. No JVD present.   Cardiovascular: Normal rate, regular rhythm, normal heart sounds and intact distal pulses.   No murmur heard.  Pulmonary/Chest: Effort normal and breath sounds normal. No respiratory distress. He has no wheezes.   Abdominal: Soft. Bowel sounds are normal. He exhibits no distension. There is tenderness. There is guarding. There is no rebound.   Left lower abdomen/flank area   Genitourinary:   Genitourinary Comments: Not examined   Musculoskeletal: Normal range of motion. He exhibits no edema.   Neurological: He is alert and oriented to person, place, and time. No cranial nerve deficit.   Skin: Skin is warm and dry. Capillary refill takes less than 2 seconds. He is not diaphoretic.   Psychiatric: He has a normal mood and affect. His behavior is normal. Judgment and thought content normal.         CRANIAL NERVES     CN III, IV, VI   Pupils are equal, round, and reactive to light.  Extraocular motions are normal.        Significant Labs:  Reviewed    Significant Imaging: Reviewed    Assessment/Plan:     * Sepsis  This patient does have evidence of infective focus  My overall impression is sepsis.  Antibiotics given-     The patient originally received IV vancomycin and Levaquin    Patient to be continued on IV vancomycin and Zosyn    Latest lactate reviewed, they are-  Lactic acid level ordered stat    Source- ureteral stone  Source control Achieved by- surgical intervention, IV antibiotics        Nicotine dependence  Smoking cessation education  Add nicotine patch      Dehydration  Add IV fluids      Hyponatremia  Suspected secondary to dehydration  Repeat BMP in the morning      Ureteral stone with hydronephrosis  And pyelonephritis with sepsis-  Add IV fluids  Check lactic acid level  Blood cultures x2 and urine culture currently pending  Antibiotics discussed with Dr. Hernandez to request patient be continued on vancomycin and placed on Zosyn  Patient taken now for urgent surgical intervention        VTE Risk Mitigation (From admission, onward)        Ordered     enoxaparin injection 40 mg  Daily      03/28/19 1736     IP VTE HIGH RISK PATIENT  Once      03/28/19 1736          CHRIST Butts  Department of Hospital Medicine   [facility]    Time spent seeing patient( greater than 1/2 spent in direct contact) : 73 minutes

## 2019-03-28 NOTE — OP NOTE
[facility] Urology Operative Report     Date: 3/28/19     Staff Surgeon: Hernando Hernandez MD     Pre-Op Diagnosis:   1. Left obstructing proximal ureteral calculus  2. Sepsis     Post-Op Diagnosis: same     Procedure(s) Performed:   Cystoscopy with placement of left double J ureteral stent, [id number]  Left retrograde pyelogram including ureteral catheter placement and injection of contrast  Flouroscopy < 1 hour  Interpretation of flouroscopic images     Specimen(s): none     Anesthesia: General LMA anesthesia     Findings: Left proximal ureteral radioopaque calculus with obstruction.     Estimated Blood Loss: minimal     Drains: left ureteral stent as above     Complications: none     Indications for procedure:  43 yo M transferred from [place] where he presented earlier with 12mm obstructing proximal ureteral calculus and persistent left flank pain, with fever, nitrite positive urine, and leukocytosis to 16. On arrival via EMS had T 103.6 with , so consented and taken for urgent ureteral stent placement.       Procedure in detail:  After appropriate informed consent was obtained, the patient was taken to the operating room and placed in the lithotomy position. He was prepped and draped in standard cystoscopic fashion.  Preop antibiotics up to date as he had received levaquin in ER as well as Vancomycin and Merrem just prior to transfer, and a WHO approved time-out was performed.     21 Armenian rigid cystoscope passed into bladder via the urethra, of which anterior and posterior urethra normal though mildly high bladder neck.  Bilateral ureteral orifices seen in the orthotopic position on the trigone.  Bladder was inspected and no mucosal lesions or abnormalities were found. Purulent efflux seen from left ureteral orifice. On fluoroscopy, stone seen as radiopaque density in area of proximal ureter     A motion guidewire was passed through the cystoscope and intubated the left ureteral orifice and passed until seen  to curl proximally in the collecting system. Guidewire confirmed to curl proximally in the collecting system under flouro, and a 5-Bangladeshi ureteral catheter was advanced over it under flouro until tip seen just distal to stone, and wire discontinued, and dilute Isovue contrast, limited, was injected in a retrograde fashion through the ureteral catheter to perform limited retrograde pyelogram which did note filling defect at level of stone and mild dilation of collecting system.  It did confirm the proximal stent curl was an upper pole calyx.  The guidewire was replaced through the open-ended catheter which was then off-loaded.  A 6 Tajik by 24 cm double-J ureteral stent was passed over the guidewire and placed in the left ureter using fluoroscopic guidance proximally and direct vision at the ureteral orifice distally.   Once the stent was confirmed in position, an 18 Tajik Diaz catheter was placed to gravity drainage.     The patient tolerated the procedure well, there were no complications.  He was awakened and taken to PACU without incident     Disposition:   He will be admitted to Hospital Medicine and monitored for his sepsis, likely urosepsis, secondary to his obstruction.  He will continue broad-spectrum IV antibiotics and sepsis protocol with fluid resuscitation and IV vancomycin and Zosyn.  Diaz catheter should remain in place until afebrile for more than 24 hr.  Blood cultures and urine culture pending from [place] should be followed closely.  Patient will likely need Case Management involvement to help determine outpatient follow up for management of his obstructing stone and subsequent stent removal.  Definitive stone procedure should not be for 2 weeks minimum, and must have documented negative urine culture after appropriate treatment to proceed

## 2019-03-28 NOTE — SUBJECTIVE & OBJECTIVE
Past Medical History:   Diagnosis Date    Renal disorder     KIDNEY STONES       Past Surgical History:   Procedure Laterality Date    ORIF RADIUS & ULNA FRACTURES         Review of patient's allergies indicates:   Allergen Reactions    Tetanus vaccines and toxoid Swelling       Current Facility-Administered Medications on File Prior to Encounter   Medication    [COMPLETED] hydromorphone (PF) injection 0.5 mg    [COMPLETED] hydromorphone (PF) injection 1 mg    [COMPLETED] meropenem-0.9% sodium chloride 1 g/50 mL IVPB    [COMPLETED] ondansetron injection 4 mg    [COMPLETED] sodium chloride 0.9% bolus 1,000 mL    [DISCONTINUED] acetaminophen suppository 650 mg    [COMPLETED] levoFLOXacin 750 mg/150 mL IVPB 750 mg    [DISCONTINUED] vancomycin (VANCOCIN) 1,020 mg in sodium chloride 0.45% IV syringe (Conc: 5 mg/ml)    [COMPLETED] vancomycin in dextrose 5 % 1 gram/250 mL IVPB 1,000 mg     Current Outpatient Medications on File Prior to Encounter   Medication Sig    [DISCONTINUED] naproxen (NAPROSYN) 500 MG tablet Take 1 tablet (500 mg total) by mouth 2 (two) times daily with meals.     Family History     None        Tobacco Use    Smoking status: Current Every Day Smoker     Packs/day: 1.00     Types: Cigarettes   Substance and Sexual Activity    Alcohol use: Yes     Comment: OCCASIONAL    Drug use: Yes     Types: Marijuana    Sexual activity: Not on file     Review of Systems   Constitutional: Positive for activity change, appetite change, chills, diaphoresis, fatigue and fever.   HENT: Negative for ear discharge, ear pain and facial swelling.    Eyes: Negative for pain and redness.   Respiratory: Negative for cough and shortness of breath.    Cardiovascular: Negative for chest pain, palpitations and leg swelling.   Gastrointestinal: Positive for abdominal pain and nausea. Negative for abdominal distention, constipation, diarrhea and vomiting.        Left lower abdominal   Endocrine: Negative for  polydipsia and polyphagia.   Genitourinary: Positive for flank pain.        Left flank pain   Musculoskeletal: Negative for neck pain and neck stiffness.   Skin: Negative for color change.   Allergic/Immunologic: Negative for food allergies.   Neurological: Negative for seizures, syncope, facial asymmetry, speech difficulty and weakness.   Hematological: Does not bruise/bleed easily.   Psychiatric/Behavioral: Negative for agitation, behavioral problems, confusion, hallucinations and suicidal ideas. The patient is nervous/anxious.      Objective:     Vital Signs (Most Recent):  Temp: (!) 103.4 °F (39.7 °C) (03/28/19 1716)  Pulse: (!) 115 (03/28/19 1716)  Resp: 20 (03/28/19 1716)  BP: 132/66 (03/28/19 1716)  SpO2: (!) 93 % (03/28/19 1716) Vital Signs (24h Range):  Temp:  [99.3 °F (37.4 °C)-103.4 °F (39.7 °C)] 103.4 °F (39.7 °C)  Pulse:  [] 115  Resp:  [17-22] 20  SpO2:  [93 %-100 %] 93 %  BP: (116-142)/(65-82) 132/66     Weight: 66.2 kg (146 lb)  Body mass index is 24.67 kg/m².    Physical Exam   Constitutional: He is oriented to person, place, and time. He appears well-developed and well-nourished. He appears distressed.   Patient having chills and significant flank pain   HENT:   Head: Normocephalic and atraumatic.   Eyes: Pupils are equal, round, and reactive to light. Conjunctivae and EOM are normal. Right eye exhibits no discharge. Left eye exhibits no discharge.   Neck: Normal range of motion. Neck supple. No JVD present.   Cardiovascular: Normal rate, regular rhythm, normal heart sounds and intact distal pulses.   No murmur heard.  Pulmonary/Chest: Effort normal and breath sounds normal. No respiratory distress. He has no wheezes.   Abdominal: Soft. Bowel sounds are normal. He exhibits no distension. There is tenderness. There is guarding. There is no rebound.   Left lower abdomen/flank area   Genitourinary:   Genitourinary Comments: Not examined   Musculoskeletal: Normal range of motion. He exhibits no  edema.   Neurological: He is alert and oriented to person, place, and time. No cranial nerve deficit.   Skin: Skin is warm and dry. Capillary refill takes less than 2 seconds. He is not diaphoretic.   Psychiatric: He has a normal mood and affect. His behavior is normal. Judgment and thought content normal.         CRANIAL NERVES     CN III, IV, VI   Pupils are equal, round, and reactive to light.  Extraocular motions are normal.        Significant Labs: Reviewed    Significant Imaging: Reviewed

## 2019-03-28 NOTE — HPI
"This is a 44 year old male with long history of prior kidney stones at least 14"  per patient who initially presented to [facility] with severe left flank pain. A CT scan of the abdomen for renal stones was obtained and showed moderate left-sided hydronephrosis secondary to a prominent partially obstructing left ureteropelvic junction calculus.  There was also left perinephric inflammatory change.  Urology was currently unavailable at [place], so the patient was transferred here to [facility] [place] for urological consultation and intervention.      Upon arriving here at [facility], the patient was noted to have 103 fever with chills.  He received a urological consultation and was brought immediately to the OR for intervention.  Blood cultures x2 and urine culture were previously sent to the lab at transferring facility.  We will check a stat lactic acid level and start IV fluids.    Patient discussed with Dr. Hernandez- will continue IV vancomycin and start IV Zosyn for coverage.      "

## 2019-03-28 NOTE — ED NOTES
"Instructed to give urine sample, pt verbalized understanding, requesting that I leave the room states " can you give me a second" informed that I wanted to assist him, pt refusing  "

## 2019-03-28 NOTE — ED NOTES
[facility] called requesting to speak with Dr. Barron stating Urologist is on phone for the patient.

## 2019-03-28 NOTE — PROGRESS NOTES
Report rec'd from B[facility] at [facility].  Pt being loaded into ambulance at this time for transport
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
## 2019-03-29 LAB
ANION GAP SERPL CALC-SCNC: 8 MMOL/L (ref 8–16)
BASOPHILS # BLD AUTO: 0 K/UL (ref 0–0.2)
BASOPHILS NFR BLD: 0.2 % (ref 0–1.9)
BUN SERPL-MCNC: 12 MG/DL (ref 6–20)
CALCIUM SERPL-MCNC: 9.1 MG/DL (ref 8.7–10.5)
CHLORIDE SERPL-SCNC: 104 MMOL/L (ref 95–110)
CO2 SERPL-SCNC: 23 MMOL/L (ref 23–29)
CREAT SERPL-MCNC: 1.1 MG/DL (ref 0.5–1.4)
DIFFERENTIAL METHOD: ABNORMAL
EOSINOPHIL # BLD AUTO: 0 K/UL (ref 0–0.5)
EOSINOPHIL NFR BLD: 0 % (ref 0–8)
ERYTHROCYTE [DISTWIDTH] IN BLOOD BY AUTOMATED COUNT: 14 % (ref 11.5–14.5)
EST. GFR  (AFRICAN AMERICAN): >60 ML/MIN/1.73 M^2
EST. GFR  (NON AFRICAN AMERICAN): >60 ML/MIN/1.73 M^2
GLUCOSE SERPL-MCNC: 164 MG/DL (ref 70–110)
HCT VFR BLD AUTO: 44.8 % (ref 40–54)
HGB BLD-MCNC: 14.6 G/DL (ref 14–18)
LYMPHOCYTES # BLD AUTO: 0.8 K/UL (ref 1–4.8)
LYMPHOCYTES NFR BLD: 5.1 % (ref 18–48)
MAGNESIUM SERPL-MCNC: 2.1 MG/DL (ref 1.6–2.6)
MCH RBC QN AUTO: 30.3 PG (ref 27–31)
MCHC RBC AUTO-ENTMCNC: 32.5 G/DL (ref 32–36)
MCV RBC AUTO: 93 FL (ref 82–98)
MONOCYTES # BLD AUTO: 0.3 K/UL (ref 0.3–1)
MONOCYTES NFR BLD: 1.8 % (ref 4–15)
NEUTROPHILS # BLD AUTO: 13.7 K/UL (ref 1.8–7.7)
NEUTROPHILS NFR BLD: 92.9 % (ref 38–73)
PLATELET # BLD AUTO: 247 K/UL (ref 150–350)
PMV BLD AUTO: 7.5 FL (ref 9.2–12.9)
POTASSIUM SERPL-SCNC: 4.2 MMOL/L (ref 3.5–5.1)
RBC # BLD AUTO: 4.81 M/UL (ref 4.6–6.2)
SODIUM SERPL-SCNC: 135 MMOL/L (ref 136–145)
WBC # BLD AUTO: 14.8 K/UL (ref 3.9–12.7)

## 2019-03-29 PROCEDURE — 36415 COLL VENOUS BLD VENIPUNCTURE: CPT

## 2019-03-29 PROCEDURE — 27000221 HC OXYGEN, UP TO 24 HOURS

## 2019-03-29 PROCEDURE — 83735 ASSAY OF MAGNESIUM: CPT

## 2019-03-29 PROCEDURE — 63600175 PHARM REV CODE 636 W HCPCS: Performed by: UROLOGY

## 2019-03-29 PROCEDURE — 25000003 PHARM REV CODE 250: Performed by: UROLOGY

## 2019-03-29 PROCEDURE — 94761 N-INVAS EAR/PLS OXIMETRY MLT: CPT

## 2019-03-29 PROCEDURE — 63600175 PHARM REV CODE 636 W HCPCS: Performed by: NURSE PRACTITIONER

## 2019-03-29 PROCEDURE — 25000003 PHARM REV CODE 250: Performed by: INTERNAL MEDICINE

## 2019-03-29 PROCEDURE — 25000003 PHARM REV CODE 250: Performed by: NURSE PRACTITIONER

## 2019-03-29 PROCEDURE — 11000001 HC ACUTE MED/SURG PRIVATE ROOM

## 2019-03-29 PROCEDURE — 85025 COMPLETE CBC W/AUTO DIFF WBC: CPT

## 2019-03-29 PROCEDURE — 63600175 PHARM REV CODE 636 W HCPCS: Performed by: INTERNAL MEDICINE

## 2019-03-29 PROCEDURE — S4991 NICOTINE PATCH NONLEGEND: HCPCS | Performed by: INTERNAL MEDICINE

## 2019-03-29 PROCEDURE — 80048 BASIC METABOLIC PNL TOTAL CA: CPT

## 2019-03-29 RX ORDER — PHENAZOPYRIDINE HYDROCHLORIDE 100 MG/1
100 TABLET, FILM COATED ORAL
Status: DISCONTINUED | OUTPATIENT
Start: 2019-03-29 | End: 2019-03-30 | Stop reason: HOSPADM

## 2019-03-29 RX ORDER — IBUPROFEN 200 MG
1 TABLET ORAL DAILY
Status: DISCONTINUED | OUTPATIENT
Start: 2019-03-29 | End: 2019-03-30

## 2019-03-29 RX ORDER — ACETAMINOPHEN 10 MG/ML
1000 INJECTION, SOLUTION INTRAVENOUS EVERY 8 HOURS
Status: COMPLETED | OUTPATIENT
Start: 2019-03-29 | End: 2019-03-30

## 2019-03-29 RX ORDER — VANCOMYCIN HCL IN 5 % DEXTROSE 1G/250ML
1000 PLASTIC BAG, INJECTION (ML) INTRAVENOUS
Status: DISCONTINUED | OUTPATIENT
Start: 2019-03-29 | End: 2019-03-30 | Stop reason: HOSPADM

## 2019-03-29 RX ADMIN — ACETAMINOPHEN 1000 MG: 10 INJECTION, SOLUTION INTRAVENOUS at 02:03

## 2019-03-29 RX ADMIN — TAMSULOSIN HYDROCHLORIDE 0.4 MG: 0.4 CAPSULE ORAL at 09:03

## 2019-03-29 RX ADMIN — PIPERACILLIN SODIUM AND TAZOBACTAM SODIUM 4.5 G: 4; .5 INJECTION, POWDER, LYOPHILIZED, FOR SOLUTION INTRAVENOUS at 12:03

## 2019-03-29 RX ADMIN — TAMSULOSIN HYDROCHLORIDE 0.4 MG: 0.4 CAPSULE ORAL at 04:03

## 2019-03-29 RX ADMIN — RAMELTEON 8 MG: 8 TABLET, FILM COATED ORAL at 09:03

## 2019-03-29 RX ADMIN — PHENAZOPYRIDINE HYDROCHLORIDE 100 MG: 100 TABLET ORAL at 05:03

## 2019-03-29 RX ADMIN — KETOROLAC TROMETHAMINE 15 MG: 30 INJECTION, SOLUTION INTRAMUSCULAR at 12:03

## 2019-03-29 RX ADMIN — KETOROLAC TROMETHAMINE 15 MG: 30 INJECTION, SOLUTION INTRAMUSCULAR at 04:03

## 2019-03-29 RX ADMIN — NICOTINE 1 PATCH: 14 PATCH, EXTENDED RELEASE TRANSDERMAL at 06:03

## 2019-03-29 RX ADMIN — PIPERACILLIN SODIUM AND TAZOBACTAM SODIUM 4.5 G: 4; .5 INJECTION, POWDER, LYOPHILIZED, FOR SOLUTION INTRAVENOUS at 09:03

## 2019-03-29 RX ADMIN — PIPERACILLIN SODIUM AND TAZOBACTAM SODIUM 4.5 G: 4; .5 INJECTION, POWDER, LYOPHILIZED, FOR SOLUTION INTRAVENOUS at 05:03

## 2019-03-29 RX ADMIN — ACETAMINOPHEN 1000 MG: 10 INJECTION, SOLUTION INTRAVENOUS at 09:03

## 2019-03-29 RX ADMIN — VANCOMYCIN HYDROCHLORIDE 1000 MG: 1 INJECTION, POWDER, FOR SOLUTION INTRAVENOUS at 12:03

## 2019-03-29 NOTE — ANESTHESIA POSTPROCEDURE EVALUATION
Anesthesia Post Evaluation    Patient: Jacky Ray    Procedure(s) Performed: Procedure(s) (LRB):  CYSTOSCOPY, WITH URETERAL STENT INSERTION (Left)    Final Anesthesia Type: general  Patient location during evaluation: PACU  Patient participation: Yes- Able to Participate  Level of consciousness: awake and alert  Post-procedure vital signs: reviewed and stable  Pain management: adequate  Airway patency: patent  PONV status at discharge: No PONV  Anesthetic complications: no      Cardiovascular status: hemodynamically stable and blood pressure returned to baseline  Respiratory status: unassisted, spontaneous ventilation and room air  Hydration status: euvolemic  Follow-up not needed.          Vitals Value Taken Time   /52 3/28/2019  7:30 PM   Temp 38 °C (100.4 °F) 3/28/2019  7:15 PM   Pulse 96 3/28/2019  7:36 PM   Resp 16 3/28/2019  7:36 PM   SpO2 96 % 3/28/2019  7:36 PM   Vitals shown include unvalidated device data.      No case tracking events are documented in the log.      Pain/Babatunde Score: Pain Rating Prior to Med Admin: 10 (3/28/2019  7:10 PM)  Babatunde Score: 8 (3/28/2019  7:00 PM)

## 2019-03-29 NOTE — PLAN OF CARE
03/29/19 1102   Discharge Assessment   Assessment Type Discharge Planning Assessment   Confirmed/corrected address and phone number on facesheet? Yes   Assessment information obtained from? Patient   Expected Length of Stay (days) 3   Communicated expected length of stay with patient/caregiver yes   Prior to hospitilization cognitive status: Alert/Oriented   Prior to hospitalization functional status: Independent   Current cognitive status: Alert/Oriented   Current Functional Status: Independent   Lives With friend(s)   Able to Return to Prior Arrangements yes   Is patient able to care for self after discharge? Yes   Patient's perception of discharge disposition admitted as an inpatient   Readmission Within the Last 30 Days no previous admission in last 30 days   Patient currently being followed by outpatient case management? No   Patient currently receives any other outside agency services? No   Equipment Currently Used at Home none   Do you have any problems affording any of your prescribed medications? No   Does the patient receive services at the [facility]? No   Discharge Plan A Home   DME Needed Upon Discharge  none   Patient/Family in Agreement with Plan yes

## 2019-03-29 NOTE — OR NURSING
Patient awake from anesthesia, yelling loudly that he needs a cigarette and that his penis is burning. Attempting to get out of bed. Reoriented X4. Anesthesia MD called to bedside

## 2019-03-29 NOTE — ASSESSMENT & PLAN NOTE
Smoking cessation education  Add nicotine patch  Dangers of cigarette smoking were reviewed with patient in detail for 10 minutes and patient was encouraged to quit. Nicotine replacement options were discussed.

## 2019-03-29 NOTE — PLAN OF CARE
03/29/19 1424   Patient Assessment/Suction   Level of Consciousness (AVPU) alert   Respiratory Effort Normal;Unlabored   PRE-TX-O2   O2 Device (Oxygen Therapy) nasal cannula   $ Is the patient on Low Flow Oxygen? Yes   Flow (L/min) 2   Oxygen Concentration (%) 28   SpO2 98 %   Pulse Oximetry Type Intermittent   $ Pulse Oximetry - Multiple Charge Pulse Oximetry - Multiple   Ready to Wean/Extubation Screen   FIO2<=50 (chart decimal) 0.28

## 2019-03-29 NOTE — CONSULTS
Date: 3/29/2019   Jacky Ray [id number] is a 44 y.o. male who has been consulted for vancomycin dosing.    The patient has the following labs:     Creatinine (mg/dl)    WBC Count   Serum creatinine: 1.1 mg/dL 03/29/19 0506  Estimated creatinine clearance: 81.3 mL/min Lab Results   Component Value Date    WBC 14.80 (H) 03/29/2019        Current weight is 78.8 kg (173 lb 11.6 oz)      Pt is receiving vancomycin 1000 mg every 24 hours.  Scr improved overnight from 1.4 to 1.1 this morning. Due to patients age and improvement in renal function will adjust regimen to 1g q12h. Trough ordered prior to the 4th dose of new regimen on 3/30 @2245. Goal 10-15.    Patient will be followed by pharmacy for changes in renal function, toxicity, and efficacy.    Thank you for allowing us to participate in this patient's care.    Kisha Ariza, PharmD

## 2019-03-29 NOTE — SUBJECTIVE & OBJECTIVE
Interval History:  No new issues overnight.  Patient reports suprapubic pain greatest to the left  He is status post Cystoscopy with placement of left double J ureteral stent, [id number]  Left retrograde pyelogram including ureteral catheter placement    Afebrile this morning continue IV antibiotics.  Staff reports agitation and request to go outside and smoke.    Review of Systems   Constitutional: Positive for appetite change, fatigue and fever. Negative for chills and diaphoresis.   Respiratory: Negative for cough, shortness of breath and wheezing.    Cardiovascular: Negative for chest pain, palpitations and leg swelling.   Gastrointestinal: Positive for abdominal pain (suprapubic ). Negative for nausea.   Genitourinary: Positive for difficulty urinating, dysuria and urgency. Negative for flank pain and hematuria.   Musculoskeletal: Negative for arthralgias, back pain and myalgias.   Neurological: Negative for dizziness, syncope and light-headedness.   Psychiatric/Behavioral: Positive for agitation. The patient is nervous/anxious.      Objective:     Vital Signs (Most Recent):  Temp: 97 °F (36.1 °C) (03/29/19 1222)  Pulse: 70 (03/29/19 1222)  Resp: 16 (03/29/19 1222)  BP: 119/67 (03/29/19 1222)  SpO2: 98 % (03/29/19 1424) Vital Signs (24h Range):  Temp:  [96.3 °F (35.7 °C)-103.4 °F (39.7 °C)] 97 °F (36.1 °C)  Pulse:  [] 70  Resp:  [10-20] 16  SpO2:  [93 %-100 %] 98 %  BP: (103-132)/(52-82) 119/67     Weight: 78.8 kg (173 lb 11.6 oz)  Body mass index is 29.36 kg/m².    Intake/Output Summary (Last 24 hours) at 3/29/2019 1431  Last data filed at 3/29/2019 0600  Gross per 24 hour   Intake 2050 ml   Output 1700 ml   Net 350 ml      Physical Exam   Constitutional: He is oriented to person, place, and time. He appears well-developed and well-nourished.   HENT:   Head: Normocephalic and atraumatic.   Nose: Nose normal.   Mouth/Throat: Oropharynx is clear and moist.   Eyes: Pupils are equal, round, and reactive to  light. Conjunctivae and EOM are normal.   Neck: Normal range of motion. Neck supple.   Cardiovascular: Normal rate, regular rhythm, normal heart sounds and intact distal pulses.   No murmur heard.  Pulmonary/Chest: Effort normal and breath sounds normal. He has no wheezes.   Abdominal: Soft. Bowel sounds are normal. There is no guarding.   Genitourinary:   Genitourinary Comments: Diaz with clear yellow urine.   Musculoskeletal: Normal range of motion.   Neurological: He is alert and oriented to person, place, and time.   Skin: Skin is warm and dry.   Psychiatric: He has a normal mood and affect. His behavior is normal.   Agitation.   Nursing note and vitals reviewed.      Significant Labs:   BMP:   Recent Labs   Lab 03/29/19  0506   *   *   K 4.2      CO2 23   BUN 12   CREATININE 1.1   CALCIUM 9.1   MG 2.1     CBC:   Recent Labs   Lab 03/28/19  1010 03/29/19  0506   WBC 16.37* 14.80*   HGB 15.6 14.6   HCT 47.3 44.8    247       Significant Imaging: I have reviewed and interpreted all pertinent imaging results/findings within the past 24 hours.

## 2019-03-29 NOTE — ASSESSMENT & PLAN NOTE
And pyelonephritis with sepsis-  Add IV fluids  Check lactic acid level  Blood cultures x2 and urine culture currently pending  Antibiotics discussed with Dr. Hernandez to request patient be continued on vancomycin and placed on Zosyn  Patient taken now for urgent surgical intervention  Status post Cystoscopy with placement of left double J ureteral stent, [id number]  Left retrograde pyelogram including ureteral catheter placement on 03/28/2009

## 2019-03-29 NOTE — ASSESSMENT & PLAN NOTE
This patient does have evidence of infective focus  My overall impression is sepsis.  Antibiotics given-     The patient originally received IV vancomycin and Levaquin    Patient to be continued on IV vancomycin and Zosyn    Latest lactate reviewed, they are-  Lactic acid level ordered stat    Source- ureteral stone  Source control Achieved by- surgical intervention, IV antibiotics  Blood culture day 1-urine culture pending

## 2019-03-29 NOTE — HOSPITAL COURSE
Patient monitor during hospitalization.  He was initiated on IV Zosyn and vancomycin, pain control, and IV fluids.   consulted.  He is status post Cystoscopy with placement of left double J ureteral stent, [id number]  Left retrograde pyelogram including ureteral catheter placement on 03/28/2019. Urine culture obtained. Patient left hospital AMA after trying to convince patient to stay for  evaluation prior to DC.

## 2019-03-29 NOTE — PROGRESS NOTES
"[facility] Medicine  Progress Note    Patient Name: Jacky Ray  MRN: [id number]  Patient Class: IP- Inpatient   Admission Date: 3/28/2019  Length of Stay: 1 days  Attending Physician: Herber Wiggins MD  Primary Care Provider: Primary Doctor No        Subjective:     Principal Problem:Sepsis    HPI:  This is a 44 year old male with long history of prior kidney stones at least 14"  per patient who initially presented to [facility] with severe left flank pain. A CT scan of the abdomen for renal stones was obtained and showed moderate left-sided hydronephrosis secondary to a prominent partially obstructing left ureteropelvic junction calculus.  There was also left perinephric inflammatory change.  Urology  was currently unavailable at [place], so the patient was transferred here to [facility] and [place] for urological consultation and intervention.      Upon arriving here at [facility], the patient was noted to have 103 fever with chills.  He received a urological consultation and was brought immediately to the OR for intervention.  Blood cultures x2 and urine culture were previously sent to the lab at transferring facility.  We will check a stat lactic acid level and start IV fluids.    Patient discussed with Dr. Hernandez- will continue IV vancomycin and start IV Zosyn for coverage.        Hospital Course:  No notes on file    Interval History:  No new issues overnight.  Patient reports suprapubic pain greatest to the left  He is status post Cystoscopy with placement of left double J ureteral stent, [id number]  Left retrograde pyelogram including ureteral catheter placement    Afebrile this morning continue IV antibiotics.  Staff reports agitation and request to go outside and smoke.    Review of Systems   Constitutional: Positive for appetite change, fatigue and fever. Negative for chills and diaphoresis.   Respiratory: Negative for cough, shortness of " breath and wheezing.    Cardiovascular: Negative for chest pain, palpitations and leg swelling.   Gastrointestinal: Positive for abdominal pain (suprapubic ). Negative for nausea.   Genitourinary: Positive for difficulty urinating, dysuria and urgency. Negative for flank pain and hematuria.   Musculoskeletal: Negative for arthralgias, back pain and myalgias.   Neurological: Negative for dizziness, syncope and light-headedness.   Psychiatric/Behavioral: Positive for agitation. The patient is nervous/anxious.      Objective:     Vital Signs (Most Recent):  Temp: 97 °F (36.1 °C) (03/29/19 1222)  Pulse: 70 (03/29/19 1222)  Resp: 16 (03/29/19 1222)  BP: 119/67 (03/29/19 1222)  SpO2: 98 % (03/29/19 1424) Vital Signs (24h Range):  Temp:  [96.3 °F (35.7 °C)-103.4 °F (39.7 °C)] 97 °F (36.1 °C)  Pulse:  [] 70  Resp:  [10-20] 16  SpO2:  [93 %-100 %] 98 %  BP: (103-132)/(52-82) 119/67     Weight: 78.8 kg (173 lb 11.6 oz)  Body mass index is 29.36 kg/m².    Intake/Output Summary (Last 24 hours) at 3/29/2019 1431  Last data filed at 3/29/2019 0600  Gross per 24 hour   Intake 2050 ml   Output 1700 ml   Net 350 ml      Physical Exam   Constitutional: He is oriented to person, place, and time. He appears well-developed and well-nourished.   HENT:   Head: Normocephalic and atraumatic.   Nose: Nose normal.   Mouth/Throat: Oropharynx is clear and moist.   Eyes: Pupils are equal, round, and reactive to light. Conjunctivae and EOM are normal.   Neck: Normal range of motion. Neck supple.   Cardiovascular: Normal rate, regular rhythm, normal heart sounds and intact distal pulses.   No murmur heard.  Pulmonary/Chest: Effort normal and breath sounds normal. He has no wheezes.   Abdominal: Soft. Bowel sounds are normal. There is no guarding.   Genitourinary:   Genitourinary Comments: Diaz with clear yellow urine.   Musculoskeletal: Normal range of motion.   Neurological: He is alert and oriented to person, place, and time.   Skin: Skin  is warm and dry.   Psychiatric: He has a normal mood and affect. His behavior is normal.   Agitation.   Nursing note and vitals reviewed.      Significant Labs:   BMP:   Recent Labs   Lab 03/29/19  0506   *   *   K 4.2      CO2 23   BUN 12   CREATININE 1.1   CALCIUM 9.1   MG 2.1     CBC:   Recent Labs   Lab 03/28/19  1010 03/29/19  0506   WBC 16.37* 14.80*   HGB 15.6 14.6   HCT 47.3 44.8    247       Significant Imaging: I have reviewed and interpreted all pertinent imaging results/findings within the past 24 hours.    Assessment/Plan:      * Sepsis  This patient does have evidence of infective focus  My overall impression is sepsis.  Antibiotics given-     The patient originally received IV vancomycin and Levaquin    Patient to be continued on IV vancomycin and Zosyn    Latest lactate reviewed, they are-  Lactic acid level ordered stat    Source- ureteral stone  Source control Achieved by- surgical intervention, IV antibiotics  Blood culture day 1-urine culture pending        Nicotine dependence  Smoking cessation education  Add nicotine patch  Dangers of cigarette smoking were reviewed with patient in detail for 10 minutes and patient was encouraged to quit. Nicotine replacement options were discussed.      Dehydration  Add IV fluids      Hyponatremia  Suspected secondary to dehydration  Repeat BMP in the morning  Improving      Ureteral stone with hydronephrosis  And pyelonephritis with sepsis-  Add IV fluids  Check lactic acid level  Blood cultures x2 and urine culture currently pending  Antibiotics discussed with Dr. Hernandez to request patient be continued on vancomycin and placed on Zosyn  Patient taken now for urgent surgical intervention  Status post Cystoscopy with placement of left double J ureteral stent, [id number]  Left retrograde pyelogram including ureteral catheter placement on 03/28/2009  Add IV acetaminophen for pain            VTE Risk Mitigation (From admission, onward)         Ordered     enoxaparin injection 40 mg  Daily      03/28/19 1736     IP VTE HIGH RISK PATIENT  Once      03/28/19 1736              Marianne Bateman NP  Department of Hospital Medicine   [facility]

## 2019-03-30 VITALS
DIASTOLIC BLOOD PRESSURE: 57 MMHG | WEIGHT: 173.75 LBS | TEMPERATURE: 97 F | RESPIRATION RATE: 18 BRPM | HEIGHT: 65 IN | OXYGEN SATURATION: 97 % | BODY MASS INDEX: 28.95 KG/M2 | SYSTOLIC BLOOD PRESSURE: 96 MMHG | HEART RATE: 52 BPM

## 2019-03-30 PROBLEM — F15.10 METHAMPHETAMINE USE: Status: ACTIVE | Noted: 2019-03-30

## 2019-03-30 PROBLEM — R80.9 PROTEINURIA: Status: ACTIVE | Noted: 2019-03-30

## 2019-03-30 LAB
BACTERIA #/AREA URNS HPF: ABNORMAL /HPF
BACTERIA THROAT CULT: NORMAL
BACTERIA UR CULT: NORMAL
BILIRUB UR QL STRIP: NEGATIVE
CLARITY UR: CLEAR
COLOR UR: YELLOW
ERYTHROCYTE [DISTWIDTH] IN BLOOD BY AUTOMATED COUNT: 13.9 % (ref 11.5–14.5)
ESTIMATED AVG GLUCOSE: 105 MG/DL (ref 68–131)
GLUCOSE UR QL STRIP: ABNORMAL
HBA1C MFR BLD HPLC: 5.3 % (ref 4–5.6)
HCT VFR BLD AUTO: 38.8 % (ref 40–54)
HGB BLD-MCNC: 12.8 G/DL (ref 14–18)
HGB UR QL STRIP: ABNORMAL
HYALINE CASTS #/AREA URNS LPF: 0 /LPF
KETONES UR QL STRIP: ABNORMAL
LEUKOCYTE ESTERASE UR QL STRIP: ABNORMAL
MCH RBC QN AUTO: 30.5 PG (ref 27–31)
MCHC RBC AUTO-ENTMCNC: 33 G/DL (ref 32–36)
MCV RBC AUTO: 92 FL (ref 82–98)
MICROSCOPIC COMMENT: ABNORMAL
NITRITE UR QL STRIP: POSITIVE
PH UR STRIP: 5 [PH] (ref 5–8)
PLATELET # BLD AUTO: 319 K/UL (ref 150–350)
PMV BLD AUTO: 7.7 FL (ref 9.2–12.9)
PROT UR QL STRIP: ABNORMAL
RBC # BLD AUTO: 4.2 M/UL (ref 4.6–6.2)
RBC #/AREA URNS HPF: >100 /HPF (ref 0–4)
SP GR UR STRIP: 1.02 (ref 1–1.03)
URN SPEC COLLECT METH UR: ABNORMAL
UROBILINOGEN UR STRIP-ACNC: >=8 EU/DL
WBC # BLD AUTO: 9.1 K/UL (ref 3.9–12.7)
WBC #/AREA URNS HPF: 5 /HPF (ref 0–5)

## 2019-03-30 PROCEDURE — 94761 N-INVAS EAR/PLS OXIMETRY MLT: CPT

## 2019-03-30 PROCEDURE — 36415 COLL VENOUS BLD VENIPUNCTURE: CPT

## 2019-03-30 PROCEDURE — 25000003 PHARM REV CODE 250: Performed by: INTERNAL MEDICINE

## 2019-03-30 PROCEDURE — 85027 COMPLETE CBC AUTOMATED: CPT

## 2019-03-30 PROCEDURE — 63600175 PHARM REV CODE 636 W HCPCS: Performed by: INTERNAL MEDICINE

## 2019-03-30 PROCEDURE — 25000003 PHARM REV CODE 250: Performed by: UROLOGY

## 2019-03-30 PROCEDURE — 63600175 PHARM REV CODE 636 W HCPCS: Performed by: UROLOGY

## 2019-03-30 PROCEDURE — 63600175 PHARM REV CODE 636 W HCPCS: Performed by: NURSE PRACTITIONER

## 2019-03-30 PROCEDURE — 25000003 PHARM REV CODE 250: Performed by: NURSE PRACTITIONER

## 2019-03-30 PROCEDURE — 83036 HEMOGLOBIN GLYCOSYLATED A1C: CPT

## 2019-03-30 PROCEDURE — 81000 URINALYSIS NONAUTO W/SCOPE: CPT

## 2019-03-30 RX ADMIN — PHENAZOPYRIDINE HYDROCHLORIDE 100 MG: 100 TABLET ORAL at 11:03

## 2019-03-30 RX ADMIN — PHENAZOPYRIDINE HYDROCHLORIDE 100 MG: 100 TABLET ORAL at 06:03

## 2019-03-30 RX ADMIN — PIPERACILLIN SODIUM AND TAZOBACTAM SODIUM 4.5 G: 4; .5 INJECTION, POWDER, LYOPHILIZED, FOR SOLUTION INTRAVENOUS at 05:03

## 2019-03-30 RX ADMIN — KETOROLAC TROMETHAMINE 15 MG: 30 INJECTION, SOLUTION INTRAMUSCULAR at 06:03

## 2019-03-30 RX ADMIN — PIPERACILLIN SODIUM AND TAZOBACTAM SODIUM 4.5 G: 4; .5 INJECTION, POWDER, LYOPHILIZED, FOR SOLUTION INTRAVENOUS at 01:03

## 2019-03-30 RX ADMIN — VANCOMYCIN HYDROCHLORIDE 1000 MG: 1 INJECTION, POWDER, FOR SOLUTION INTRAVENOUS at 02:03

## 2019-03-30 RX ADMIN — VANCOMYCIN HYDROCHLORIDE 1000 MG: 1 INJECTION, POWDER, FOR SOLUTION INTRAVENOUS at 11:03

## 2019-03-30 RX ADMIN — ACETAMINOPHEN 1000 MG: 10 INJECTION, SOLUTION INTRAVENOUS at 05:03

## 2019-03-30 NOTE — PLAN OF CARE
Problem: Adult Inpatient Plan of Care  Goal: Plan of Care Review  Outcome: Ongoing (interventions implemented as appropriate)  Pain karolyn, kidney stones s/s, smoking, ivf   03/30/19 6340   Plan of Care Review   Plan of Care Reviewed With patient

## 2019-03-30 NOTE — SUBJECTIVE & OBJECTIVE
Interval History:  Patient found to have hematuria which he states began last night.  He reports feeling warm and diaphoretic although no recorded temperature per nursing staff.  He reports persistent suprapubic pain greatest to the left   He frequently ambulates off floor to smoke.      Review of Systems   Constitutional: Positive for appetite change, fatigue and fever. Negative for chills and diaphoresis.   Respiratory: Negative for cough, shortness of breath and wheezing.    Cardiovascular: Negative for chest pain, palpitations and leg swelling.   Gastrointestinal: Positive for abdominal pain (suprapubic ). Negative for nausea.   Genitourinary: Positive for difficulty urinating, dysuria, hematuria and urgency. Negative for flank pain.   Musculoskeletal: Negative for arthralgias, back pain and myalgias.   Neurological: Negative for dizziness, syncope and light-headedness.   Psychiatric/Behavioral: Positive for agitation. The patient is nervous/anxious.      Objective:     Vital Signs (Most Recent):  Temp: 97.4 °F (36.3 °C) (03/30/19 1207)  Pulse: (!) 52 (03/30/19 1207)  Resp: 18 (03/30/19 1207)  BP: (!) 96/57 (03/30/19 1207)  SpO2: 97 % (03/30/19 1207) Vital Signs (24h Range):  Temp:  [96 °F (35.6 °C)-98.2 °F (36.8 °C)] 97.4 °F (36.3 °C)  Pulse:  [52-74] 52  Resp:  [18-19] 18  SpO2:  [97 %-98 %] 97 %  BP: ()/(57-62) 96/57     Weight: 78.8 kg (173 lb 11.6 oz)  Body mass index is 29.36 kg/m².    Intake/Output Summary (Last 24 hours) at 3/30/2019 1341  Last data filed at 3/30/2019 1000  Gross per 24 hour   Intake 1700 ml   Output 2200 ml   Net -500 ml      Physical Exam   Constitutional: He is oriented to person, place, and time. He appears well-developed and well-nourished.   HENT:   Head: Normocephalic and atraumatic.   Nose: Nose normal.   Mouth/Throat: Oropharynx is clear and moist.   Eyes: Pupils are equal, round, and reactive to light. Conjunctivae and EOM are normal.   Neck: Normal range of motion. Neck  supple.   Cardiovascular: Normal rate, regular rhythm, normal heart sounds and intact distal pulses.   No murmur heard.  Pulmonary/Chest: Effort normal and breath sounds normal. He has no wheezes.   Abdominal: Soft. Bowel sounds are normal. There is no guarding.   Genitourinary:   Genitourinary Comments: Diaz with hematuria noted   Musculoskeletal: Normal range of motion.   Neurological: He is alert and oriented to person, place, and time.   Skin: Skin is warm and dry.   Psychiatric: He has a normal mood and affect. His behavior is normal.   Agitation.   Nursing note and vitals reviewed.      Significant Labs:   BMP:   Recent Labs   Lab 03/29/19  0506   *   *   K 4.2      CO2 23   BUN 12   CREATININE 1.1   CALCIUM 9.1   MG 2.1     CBC:   Recent Labs   Lab 03/29/19  0506 03/30/19  0926   WBC 14.80* 9.10   HGB 14.6 12.8*   HCT 44.8 38.8*    319       Significant Imaging: I have reviewed and interpreted all pertinent imaging results/findings within the past 24 hours.

## 2019-03-30 NOTE — NURSING
Notified Dr. Hernandez that RUIZ Bateman would like him to come assess pt at bedside. Dr. Hernandez stated it was normal to have hematuria after surgery. I let him know that when I attempted to d/c chan this AM, only 5ml from balloon was able to be removed, and resistance was met. Dr. Hernandez said not to touch the chan and that he will be by to assess.   3rd floor RN, Madeline, said that she was notified by Dr. Hernandez to come remove pt's chan catheter. Madeline met resistance but able to remove chan. Pt became very agitated during removal stating that he was in extreme pain. After Chan catheter removed, pt jumped out of bed and pulled off his gown and threatening to pull PIVs. Pt extremely agitated and cussing, stating he is leaving. Notified RUIZ Bateman whom contacted Dr. Hernandez to come see pt. RUIZ Bateman and I met with pt at bedside and educated pt on importance of not leaving yet and informed him that Dr. Hernandez is on his way to the hospital to come speak with him. I ensured that  ALL IV's removed, catheters intact. Provided pt with AMA form which he refused to sign. Pt grabbed all his belongings and walked off unit.

## 2019-03-30 NOTE — PROGRESS NOTES
03/30/19 0932   Patient Assessment/Suction   Level of Consciousness (AVPU) alert   PRE-TX-O2   O2 Device (Oxygen Therapy) room air   SpO2 98 %   Pulse Oximetry Type Intermittent   $ Pulse Oximetry - Multiple Charge Pulse Oximetry - Multiple   Pulse 61   Resp 18

## 2019-03-30 NOTE — NURSING
New orders to discontinue chan cathter. Urine in catheter is intermittently bright red to yellow since shift. Pt says urine has been like this since yesterday. Attempted to deflate cathter balloon, and was only able to remove 5ml of water, and met resistance. Notified RUIZ Bateman NP, who assessed pt at bedside. She ordered to keep chan cathter in place and to get a UA to see if urine is positive for occult blood. Replaced 5ml of water back into catheter balloon, without resistance. Chan catheter draining fine. UA obtained and notified RUIZ Bateman of the following results :    Results for MARIA G HULL (MRN [id number]) as of 3/30/2019 13:24   Ref. Range 3/30/2019 10:05   Specimen UA Unknown Urine, Catheterized   Color, UA Latest Ref Range: Yellow, Straw, Angelia  Yellow   pH, UA Latest Ref Range: 5.0 - 8.0  5.0   Specific Gravity, UA Latest Ref Range: 1.005 - 1.030  1.025   Appearance, UA Latest Ref Range: Clear  Clear   Protein, UA Latest Ref Range: Negative  3+ (A)   Glucose, UA Latest Ref Range: Negative  2+ (A)   Ketones, UA Latest Ref Range: Negative  1+ (A)   Occult Blood UA Latest Ref Range: Negative  3+ (A)   Nitrite, UA Latest Ref Range: Negative  Positive (A)   Urobilinogen, UA Latest Ref Range: <2.0 EU/dL >=8.0 (A)   Bilirubin (UA) Latest Ref Range: Negative  Negative   Leukocytes, UA Latest Ref Range: Negative  1+ (A)   RBC, UA Latest Ref Range: 0 - 4 /hpf >100 (H)   WBC, UA Latest Ref Range: 0 - 5 /hpf 5   Bacteria, UA Latest Ref Range: None-Occ /hpf Few (A)   Hyaline Casts, UA Latest Ref Range: 0-1/lpf /lpf 0   Microscopic Comment Unknown SEE COMMENT     RUIZ Bateman NP, ordered to leave chan catheter and to have Urologist, Dr. Hernandez, evaluate.

## 2019-03-30 NOTE — PROGRESS NOTES
Staff called to report patient agitated and wants to leave against medical advice.  Discussed with patient outpatient plan.  Recommended he remain in hospital pending Dr. Hernandez's evaluation and discussion on outpatient plan. Patient states he is too agitated and requested to go outside and smoke.  Patient subsequently was noted to grab his personal belongings and exit the telemetry floor.  Dr. Hernandez notified.

## 2019-03-30 NOTE — PROGRESS NOTES
"[facility] Medicine  Progress Note    Patient Name: Jacky Ray  MRN: [id number]  Patient Class: IP- Inpatient   Admission Date: 3/28/2019  Length of Stay: 2 days  Attending Physician: Cindi Recio MD  Primary Care Provider: Primary Doctor No        Subjective:     Principal Problem:Sepsis    HPI:  This is a 44 year old male with long history of prior kidney stones at least 14"  per patient who initially presented to [facility] with severe left flank pain. A CT scan of the abdomen for renal stones was obtained and showed moderate left-sided hydronephrosis secondary to a prominent partially obstructing left ureteropelvic junction calculus.  There was also left perinephric inflammatory change.  Urology  was currently unavailable at [place], so the patient was transferred here to [facility] and [place] for urological consultation and intervention.      Upon arriving here at [facility], the patient was noted to have 103 fever with chills.  He received a urological consultation and was brought immediately to the OR for intervention.  Blood cultures x2 and urine culture were previously sent to the lab at transferring facility.  We will check a stat lactic acid level and start IV fluids.    Patient discussed with Dr. Hernandez- will continue IV vancomycin and start IV Zosyn for coverage.        Hospital Course:  Patient monitor during hospitalization.  He was initiated on IV Zosyn and vancomycin, pain control, and IV fluids.   consulted.  He is status post Cystoscopy with placement of left double J ureteral stent, [id number]  Left retrograde pyelogram including ureteral catheter placement on 03/28/2019.        Interval History:  Patient found to have hematuria which he states began last night.  He reports feeling warm and diaphoretic although no recorded temperature per nursing staff.  He reports persistent suprapubic pain greatest to the left   He frequently " ambulates off floor to smoke.      Review of Systems   Constitutional: Positive for appetite change, fatigue and fever. Negative for chills and diaphoresis.   Respiratory: Negative for cough, shortness of breath and wheezing.    Cardiovascular: Negative for chest pain, palpitations and leg swelling.   Gastrointestinal: Positive for abdominal pain (suprapubic ). Negative for nausea.   Genitourinary: Positive for difficulty urinating, dysuria, hematuria and urgency. Negative for flank pain.   Musculoskeletal: Negative for arthralgias, back pain and myalgias.   Neurological: Negative for dizziness, syncope and light-headedness.   Psychiatric/Behavioral: Positive for agitation. The patient is nervous/anxious.      Objective:     Vital Signs (Most Recent):  Temp: 97.4 °F (36.3 °C) (03/30/19 1207)  Pulse: (!) 52 (03/30/19 1207)  Resp: 18 (03/30/19 1207)  BP: (!) 96/57 (03/30/19 1207)  SpO2: 97 % (03/30/19 1207) Vital Signs (24h Range):  Temp:  [96 °F (35.6 °C)-98.2 °F (36.8 °C)] 97.4 °F (36.3 °C)  Pulse:  [52-74] 52  Resp:  [18-19] 18  SpO2:  [97 %-98 %] 97 %  BP: ()/(57-62) 96/57     Weight: 78.8 kg (173 lb 11.6 oz)  Body mass index is 29.36 kg/m².    Intake/Output Summary (Last 24 hours) at 3/30/2019 1341  Last data filed at 3/30/2019 1000  Gross per 24 hour   Intake 1700 ml   Output 2200 ml   Net -500 ml      Physical Exam   Constitutional: He is oriented to person, place, and time. He appears well-developed and well-nourished.   HENT:   Head: Normocephalic and atraumatic.   Nose: Nose normal.   Mouth/Throat: Oropharynx is clear and moist.   Eyes: Pupils are equal, round, and reactive to light. Conjunctivae and EOM are normal.   Neck: Normal range of motion. Neck supple.   Cardiovascular: Normal rate, regular rhythm, normal heart sounds and intact distal pulses.   No murmur heard.  Pulmonary/Chest: Effort normal and breath sounds normal. He has no wheezes.   Abdominal: Soft. Bowel sounds are normal. There is no  guarding.   Genitourinary:   Genitourinary Comments: Diaz with hematuria noted   Musculoskeletal: Normal range of motion.   Neurological: He is alert and oriented to person, place, and time.   Skin: Skin is warm and dry.   Psychiatric: He has a normal mood and affect. His behavior is normal.   Agitation.   Nursing note and vitals reviewed.      Significant Labs:   BMP:   Recent Labs   Lab 03/29/19  0506   *   *   K 4.2      CO2 23   BUN 12   CREATININE 1.1   CALCIUM 9.1   MG 2.1     CBC:   Recent Labs   Lab 03/29/19  0506 03/30/19  0926   WBC 14.80* 9.10   HGB 14.6 12.8*   HCT 44.8 38.8*    319       Significant Imaging: I have reviewed and interpreted all pertinent imaging results/findings within the past 24 hours.    Assessment/Plan:      * Sepsis  This patient does have evidence of infective focus  My overall impression is sepsis.  Antibiotics given-     The patient originally received IV vancomycin and Levaquin    Patient to be continued on IV vancomycin and Zosyn    Latest lactate reviewed, they are-  Lactic acid level ordered stat    Source- ureteral stone  Source control Achieved by- surgical intervention, IV antibiotics  Blood culture day 1-urine culture negative  Urine culture greater than 100,000 gram-negative rods        Methamphetamine use  Noted on urine drug screen in the emergency room department.  Monitor for withdrawal symptoms.  Patient likely with mild withdrawal at this time with diaphoresis and agitation.      Proteinuria  Noted on UA 3+.  Will check hemoglobin A1c to evaluate for diabetes      Nicotine dependence  Smoking cessation education  Add nicotine patch  Dangers of cigarette smoking were reviewed with patient in detail for 10 minutes and patient was encouraged to quit. Nicotine replacement options were discussed.      Dehydration  Add IV fluids      Hyponatremia  Suspected secondary to dehydration  Repeat BMP in the morning  Improving      Pyelonephritis   On  IV vanc and Zosyn.  Urine culture sensitivity is pending      Ureteral stone with hydronephrosis  And pyelonephritis with sepsis-  Add IV fluids  Check lactic acid level  Blood cultures x2 and urine culture currently pending  Antibiotics discussed with Dr. Hernandez to request patient be continued on vancomycin and placed on Zosyn  Patient taken now for urgent surgical intervention  Status post Cystoscopy with placement of left double J ureteral stent, [id number]  Left retrograde pyelogram including ureteral catheter placement on 03/28/2009            VTE Risk Mitigation (From admission, onward)        Ordered     IP VTE HIGH RISK PATIENT  Once      03/28/19 7420              Marianne Bateman NP  Department of Hospital Medicine   [facility]

## 2019-03-30 NOTE — PROGRESS NOTES
TELEPHONE NOTE    Had been discussing Patient with ASHLEY Bateman.  Last night was noted to have some bladder spasms and therefore advise could give peridium and Diaz could be removed in morning as long as he was afebrile and white count continued to trend down.     I was called at 2:00 p.m. By patient's nurse reporting that she attempted to remove his catheter this morning and was unable to deflate the balloon all the way and pulled catheter and could not get out to reinflate the balloon and left it in place.    Was notified he could otherwise be discharged his Diaz was removed.      Notified nursing staff that I was finishing rounds at [facility] and I would be over to see him to make an outpatient plan for his stone and stent on not to discharge him until that conversation.       Asst charge nurse from 3rd floor to assist in Diaz catheter removal in the meantime, which was successful.      Once patient's Diaz catheter was removed he did remove his on IV and leave AMA before I could see him.      Uroseptic patient left without antibiotics and with indwelling ureteral stent.  Retained stent risk.  We will make attempts to contact for follow-up

## 2019-03-30 NOTE — ASSESSMENT & PLAN NOTE
[facility] This patient does have evidence of infective focus  My overall impression is sepsis.  Antibiotics given-     The patient originally received IV vancomycin and Levaquin    Patient to be continued on IV vancomycin and Zosyn    Latest lactate reviewed, they are-  Lactic acid level ordered stat    Source- ureteral stone  Source control Achieved by- surgical intervention, IV antibiotics  Blood culture day 1-urine culture negative  Urine culture greater than 100,000 gram-negative rods

## 2019-03-31 NOTE — PLAN OF CARE
03/31/19 1418   Final Note   Assessment Type Final Discharge Note   Anticipated Discharge Disposition Left Against

## 2019-03-31 NOTE — PHYSICIAN QUERY
"PT Name: Jacky Ray  MR #: [id number]    Physician Query Form - Cause and Effect Relationship Clarification      CDS: Melissa Love RN, CCDS         Contact information :[phone]  [email or web address]     This form is a permanent document in the medical record.     Query Date: March 31, 2019    By submitting this query, we are merely seeking further clarification of documentation. Please utilize your independent clinical judgment when addressing the question(s) below.    The Medical record contains the following:  Supporting Clinical Findings   Location in record            "Principal Problem:Sepsis"   "Ureteral stone with hydronephrosis and obstruction, pyelonephritis with sepsis"   "continue IV vancomycin and start IV Zosyn for coverage."                                                         Urine culture     KLEBSIELLA PNEUMONIAE   >100,000 cfu/ml                                                                                                                       H&P 3/28/19              Lab 3/28                                                                                                                                                                                                       Doctor, please clarify if there is any correlation between Sepsis and KLEBSIELLA PNEUMONIAE.           Are the conditions:      [x  ] Due to or associated with each other   [  ] Unrelated to each other   [  ] Other (Please Specify): _________________________   [  ] Clinically Undetermined                                                                                                                                                                                             "

## 2019-04-02 LAB
BACTERIA BLD CULT: NORMAL
BACTERIA BLD CULT: NORMAL

## 2019-04-05 NOTE — DISCHARGE SUMMARY
"[facility] Medicine  Discharge Summary      Patient Name: Jacky Ray  MRN: [id number]  Admission Date: 3/28/2019  Hospital Length of Stay: 2 days  Discharge Date and Time: 3/30/2019  3:00 PM  Attending Physician: Zara att. providers found   Discharging Provider: Marianne Bateman NP  Primary Care Provider: Primary Doctor Zara      HPI:   This is a 44 year old male with long history of prior kidney stones at least 14"  per patient who initially presented to [facility] with severe left flank pain. A CT scan of the abdomen for renal stones was obtained and showed moderate left-sided hydronephrosis secondary to a prominent partially obstructing left ureteropelvic junction calculus.  There was also left perinephric inflammatory change.  Urology  was currently unavailable at [place], so the patient was transferred here to [facility] [place] for urological consultation and intervention.      Upon arriving here at [facility], the patient was noted to have 103 fever with chills.  He received a urological consultation and was brought immediately to the OR for intervention.  Blood cultures x2 and urine culture were previously sent to the lab at transferring facility.  We will check a stat lactic acid level and start IV fluids.    Patient discussed with Dr. Hernandez- will continue IV vancomycin and start IV Zosyn for coverage.        Procedure(s) (LRB):  CYSTOSCOPY, WITH URETERAL STENT INSERTION (Left)      Hospital Course:   Patient monitor during hospitalization.  He was initiated on IV Zosyn and vancomycin, pain control, and IV fluids.   consulted.  He is status post Cystoscopy with placement of left double J ureteral stent, [id number]  Left retrograde pyelogram including ureteral catheter placement on 03/28/2019. Urine culture obtained. Patient left hospital AMA after trying to convince patient to stay for  evaluation prior to DC.           Consults:   Consults " (From admission, onward)        Status Ordering Provider     Inpatient consult to Urology  Once     Provider:  Hernando Hernandez MD    Completed KOLBY ESCALANTE            Final Active Diagnoses:    Diagnosis Date Noted POA    PRINCIPAL PROBLEM:  Sepsis [A41.9] 03/28/2019 Yes    Proteinuria [R80.9] 03/30/2019 Yes    Methamphetamine use [F15.10] 03/30/2019 Yes    Ureteral stone with hydronephrosis [N13.2] 03/28/2019 Yes    Pyelonephritis [N12] 03/28/2019 Yes    Hyponatremia [E87.1] 03/28/2019 Yes    Dehydration [E86.0] 03/28/2019 Yes    Nicotine dependence [F17.200] 03/28/2019 Yes    Ureteral stone [N20.1] 03/28/2019 Yes      Problems Resolved During this Admission:       Discharged Condition: against medical advice    Disposition: Left Against Medical Adv*    Follow Up:    Patient Instructions:   No discharge procedures on file.    Significant Diagnostic Studies: Labs: BMP: No results for input(s): GLU, NA, K, CL, CO2, BUN, CREATININE, CALCIUM, MG in the last 48 hours. and CMP No results for input(s): NA, K, CL, CO2, GLU, BUN, CREATININE, CALCIUM, PROT, ALBUMIN, BILITOT, ALKPHOS, AST, ALT, ANIONGAP, ESTGFRAFRICA, EGFRNONAA in the last 48 hours.    Pending Diagnostic Studies:     None         Medications:  Reconciled Home Medications:      Medication List      ASK your doctor about these medications    naproxen 500 MG tablet  Commonly known as:  NAPROSYN  Take 1 tablet (500 mg total) by mouth 2 (two) times daily with meals.            Indwelling Lines/Drains at time of discharge:   Lines/Drains/Airways     Drain                 Urethral Catheter 03/28/19 1805 Latex 18 Fr. 8 days                Time spent on the discharge of patient: 35 minutes  Patient was seen and examined on the date of discharge and determined to be suitable for discharge.         Marianne Bateman NP  Department of Hospital Medicine  [facility]

## 2019-04-23 ENCOUNTER — HOSPITAL ENCOUNTER (EMERGENCY)
Facility: HOSPITAL | Age: 44
Discharge: LEFT BEFORE DISCHARGE | End: 2019-04-23
Attending: EMERGENCY MEDICINE

## 2019-04-23 ENCOUNTER — HOSPITAL ENCOUNTER (INPATIENT)
Facility: HOSPITAL | Age: 44
LOS: 3 days | Discharge: HOME | DRG: 660 | End: 2019-04-26
Attending: EMERGENCY MEDICINE | Admitting: HOSPITALIST

## 2019-04-23 VITALS
TEMPERATURE: 98 F | HEART RATE: 98 BPM | HEIGHT: 64 IN | OXYGEN SATURATION: 98 % | WEIGHT: 166 LBS | RESPIRATION RATE: 20 BRPM | SYSTOLIC BLOOD PRESSURE: 120 MMHG | BODY MASS INDEX: 28.34 KG/M2 | DIASTOLIC BLOOD PRESSURE: 98 MMHG

## 2019-04-23 DIAGNOSIS — N20.0 KIDNEY STONE ON LEFT SIDE: ICD-10-CM

## 2019-04-23 DIAGNOSIS — N10 ACUTE PYELONEPHRITIS: Primary | ICD-10-CM

## 2019-04-23 DIAGNOSIS — Z87.442 HISTORY OF NEPHROLITHIASIS: Primary | ICD-10-CM

## 2019-04-23 LAB
ALBUMIN SERPL BCP-MCNC: 3.4 G/DL (ref 3.5–5.2)
ALP SERPL-CCNC: 105 U/L (ref 55–135)
ALT SERPL W/O P-5'-P-CCNC: 40 U/L (ref 10–44)
ANION GAP SERPL CALC-SCNC: 9 MMOL/L (ref 8–16)
AST SERPL-CCNC: 22 U/L (ref 10–40)
BACTERIA #/AREA URNS HPF: ABNORMAL /HPF
BASOPHILS # BLD AUTO: 0 K/UL (ref 0–0.2)
BASOPHILS NFR BLD: 0.3 % (ref 0–1.9)
BILIRUB SERPL-MCNC: 0.6 MG/DL (ref 0.1–1)
BILIRUB UR QL STRIP: ABNORMAL
BUN SERPL-MCNC: 10 MG/DL (ref 6–20)
CALCIUM SERPL-MCNC: 9.8 MG/DL (ref 8.7–10.5)
CHLORIDE SERPL-SCNC: 101 MMOL/L (ref 95–110)
CLARITY UR: ABNORMAL
CO2 SERPL-SCNC: 23 MMOL/L (ref 23–29)
COLOR UR: YELLOW
CREAT SERPL-MCNC: 1.3 MG/DL (ref 0.5–1.4)
DIFFERENTIAL METHOD: ABNORMAL
EOSINOPHIL # BLD AUTO: 0.1 K/UL (ref 0–0.5)
EOSINOPHIL NFR BLD: 0.7 % (ref 0–8)
ERYTHROCYTE [DISTWIDTH] IN BLOOD BY AUTOMATED COUNT: 13.4 % (ref 11.5–14.5)
EST. GFR  (AFRICAN AMERICAN): >60 ML/MIN/1.73 M^2
EST. GFR  (NON AFRICAN AMERICAN): >60 ML/MIN/1.73 M^2
GLUCOSE SERPL-MCNC: 108 MG/DL (ref 70–110)
GLUCOSE UR QL STRIP: NEGATIVE
HCT VFR BLD AUTO: 44.4 % (ref 40–54)
HGB BLD-MCNC: 14.7 G/DL (ref 14–18)
HGB UR QL STRIP: ABNORMAL
HYALINE CASTS #/AREA URNS LPF: 0 /LPF
KETONES UR QL STRIP: NEGATIVE
LACTATE SERPL-SCNC: 0.7 MMOL/L (ref 0.5–2.2)
LACTATE SERPL-SCNC: 1.4 MMOL/L (ref 0.5–2.2)
LEUKOCYTE ESTERASE UR QL STRIP: ABNORMAL
LYMPHOCYTES # BLD AUTO: 1.8 K/UL (ref 1–4.8)
LYMPHOCYTES NFR BLD: 14 % (ref 18–48)
MCH RBC QN AUTO: 29.8 PG (ref 27–31)
MCHC RBC AUTO-ENTMCNC: 33 G/DL (ref 32–36)
MCV RBC AUTO: 90 FL (ref 82–98)
MICROSCOPIC COMMENT: ABNORMAL
MONOCYTES # BLD AUTO: 1.1 K/UL (ref 0.3–1)
MONOCYTES NFR BLD: 8.7 % (ref 4–15)
NEUTROPHILS # BLD AUTO: 9.6 K/UL (ref 1.8–7.7)
NEUTROPHILS NFR BLD: 76.3 % (ref 38–73)
NITRITE UR QL STRIP: POSITIVE
PH UR STRIP: 6 [PH] (ref 5–8)
PLATELET # BLD AUTO: 312 K/UL (ref 150–350)
PMV BLD AUTO: 7.8 FL (ref 9.2–12.9)
POTASSIUM SERPL-SCNC: 4.2 MMOL/L (ref 3.5–5.1)
PROT SERPL-MCNC: 7.8 G/DL (ref 6–8.4)
PROT UR QL STRIP: ABNORMAL
RBC # BLD AUTO: 4.92 M/UL (ref 4.6–6.2)
RBC #/AREA URNS HPF: >100 /HPF (ref 0–4)
SODIUM SERPL-SCNC: 133 MMOL/L (ref 136–145)
SP GR UR STRIP: >=1.03 (ref 1–1.03)
SQUAMOUS #/AREA URNS HPF: 3 /HPF
URN SPEC COLLECT METH UR: ABNORMAL
UROBILINOGEN UR STRIP-ACNC: ABNORMAL EU/DL
WBC # BLD AUTO: 12.6 K/UL (ref 3.9–12.7)
WBC #/AREA URNS HPF: >100 /HPF (ref 0–5)

## 2019-04-23 PROCEDURE — 94761 N-INVAS EAR/PLS OXIMETRY MLT: CPT

## 2019-04-23 PROCEDURE — 25000003 PHARM REV CODE 250: Performed by: HOSPITALIST

## 2019-04-23 PROCEDURE — 96374 THER/PROPH/DIAG INJ IV PUSH: CPT

## 2019-04-23 PROCEDURE — 99281 EMR DPT VST MAYX REQ PHY/QHP: CPT | Mod: 27

## 2019-04-23 PROCEDURE — S4991 NICOTINE PATCH NONLEGEND: HCPCS | Performed by: HOSPITALIST

## 2019-04-23 PROCEDURE — 87186 SC STD MICRODIL/AGAR DIL: CPT

## 2019-04-23 PROCEDURE — 85025 COMPLETE CBC W/AUTO DIFF WBC: CPT

## 2019-04-23 PROCEDURE — 87040 BLOOD CULTURE FOR BACTERIA: CPT

## 2019-04-23 PROCEDURE — 87088 URINE BACTERIA CULTURE: CPT

## 2019-04-23 PROCEDURE — 87086 URINE CULTURE/COLONY COUNT: CPT

## 2019-04-23 PROCEDURE — 83605 ASSAY OF LACTIC ACID: CPT | Mod: 91

## 2019-04-23 PROCEDURE — 87077 CULTURE AEROBIC IDENTIFY: CPT

## 2019-04-23 PROCEDURE — 25000003 PHARM REV CODE 250: Performed by: EMERGENCY MEDICINE

## 2019-04-23 PROCEDURE — 81000 URINALYSIS NONAUTO W/SCOPE: CPT

## 2019-04-23 PROCEDURE — 12000002 HC ACUTE/MED SURGE SEMI-PRIVATE ROOM

## 2019-04-23 PROCEDURE — 99285 EMERGENCY DEPT VISIT HI MDM: CPT | Mod: 25

## 2019-04-23 PROCEDURE — 63600175 PHARM REV CODE 636 W HCPCS: Performed by: EMERGENCY MEDICINE

## 2019-04-23 PROCEDURE — 80053 COMPREHEN METABOLIC PANEL: CPT

## 2019-04-23 PROCEDURE — 96375 TX/PRO/DX INJ NEW DRUG ADDON: CPT

## 2019-04-23 PROCEDURE — 36415 COLL VENOUS BLD VENIPUNCTURE: CPT

## 2019-04-23 RX ORDER — GLUCAGON 1 MG
1 KIT INJECTION
Status: DISCONTINUED | OUTPATIENT
Start: 2019-04-23 | End: 2019-04-26 | Stop reason: HOSPADM

## 2019-04-23 RX ORDER — SODIUM CHLORIDE 9 MG/ML
INJECTION, SOLUTION INTRAVENOUS CONTINUOUS
Status: DISCONTINUED | OUTPATIENT
Start: 2019-04-23 | End: 2019-04-23

## 2019-04-23 RX ORDER — IBUPROFEN 200 MG
16 TABLET ORAL
Status: DISCONTINUED | OUTPATIENT
Start: 2019-04-23 | End: 2019-04-26 | Stop reason: HOSPADM

## 2019-04-23 RX ORDER — AMOXICILLIN 250 MG
1 CAPSULE ORAL 2 TIMES DAILY
Status: DISCONTINUED | OUTPATIENT
Start: 2019-04-23 | End: 2019-04-26 | Stop reason: HOSPADM

## 2019-04-23 RX ORDER — SODIUM,POTASSIUM PHOSPHATES 280-250MG
2 POWDER IN PACKET (EA) ORAL
Status: DISCONTINUED | OUTPATIENT
Start: 2019-04-23 | End: 2019-04-25

## 2019-04-23 RX ORDER — LANOLIN ALCOHOL/MO/W.PET/CERES
800 CREAM (GRAM) TOPICAL
Status: DISCONTINUED | OUTPATIENT
Start: 2019-04-23 | End: 2019-04-25

## 2019-04-23 RX ORDER — POTASSIUM CHLORIDE 20 MEQ/15ML
40 SOLUTION ORAL
Status: DISCONTINUED | OUTPATIENT
Start: 2019-04-23 | End: 2019-04-25

## 2019-04-23 RX ORDER — AMOXICILLIN 250 MG
1 CAPSULE ORAL DAILY PRN
Status: DISCONTINUED | OUTPATIENT
Start: 2019-04-23 | End: 2019-04-26 | Stop reason: HOSPADM

## 2019-04-23 RX ORDER — ACETAMINOPHEN 325 MG/1
650 TABLET ORAL EVERY 6 HOURS PRN
Status: DISCONTINUED | OUTPATIENT
Start: 2019-04-23 | End: 2019-04-26 | Stop reason: HOSPADM

## 2019-04-23 RX ORDER — FENTANYL CITRATE 50 UG/ML
75 INJECTION, SOLUTION INTRAMUSCULAR; INTRAVENOUS
Status: COMPLETED | OUTPATIENT
Start: 2019-04-23 | End: 2019-04-23

## 2019-04-23 RX ORDER — SODIUM CHLORIDE 0.9 % (FLUSH) 0.9 %
10 SYRINGE (ML) INJECTION
Status: DISCONTINUED | OUTPATIENT
Start: 2019-04-23 | End: 2019-04-26 | Stop reason: HOSPADM

## 2019-04-23 RX ORDER — RAMELTEON 8 MG/1
8 TABLET ORAL NIGHTLY PRN
Status: DISCONTINUED | OUTPATIENT
Start: 2019-04-23 | End: 2019-04-26 | Stop reason: HOSPADM

## 2019-04-23 RX ORDER — IBUPROFEN 200 MG
1 TABLET ORAL DAILY
Status: DISCONTINUED | OUTPATIENT
Start: 2019-04-23 | End: 2019-04-26 | Stop reason: HOSPADM

## 2019-04-23 RX ORDER — KETOROLAC TROMETHAMINE 30 MG/ML
30 INJECTION, SOLUTION INTRAMUSCULAR; INTRAVENOUS
Status: DISCONTINUED | OUTPATIENT
Start: 2019-04-23 | End: 2019-04-23 | Stop reason: HOSPADM

## 2019-04-23 RX ORDER — HYDROMORPHONE HYDROCHLORIDE 2 MG/ML
1 INJECTION, SOLUTION INTRAMUSCULAR; INTRAVENOUS; SUBCUTANEOUS EVERY 4 HOURS PRN
Status: DISCONTINUED | OUTPATIENT
Start: 2019-04-23 | End: 2019-04-25

## 2019-04-23 RX ORDER — IBUPROFEN 200 MG
24 TABLET ORAL
Status: DISCONTINUED | OUTPATIENT
Start: 2019-04-23 | End: 2019-04-26 | Stop reason: HOSPADM

## 2019-04-23 RX ORDER — HYDROMORPHONE HYDROCHLORIDE 2 MG/ML
1 INJECTION, SOLUTION INTRAMUSCULAR; INTRAVENOUS; SUBCUTANEOUS
Status: COMPLETED | OUTPATIENT
Start: 2019-04-23 | End: 2019-04-23

## 2019-04-23 RX ORDER — SODIUM CHLORIDE 9 MG/ML
INJECTION, SOLUTION INTRAVENOUS CONTINUOUS
Status: DISCONTINUED | OUTPATIENT
Start: 2019-04-23 | End: 2019-04-26 | Stop reason: HOSPADM

## 2019-04-23 RX ORDER — ONDANSETRON 2 MG/ML
4 INJECTION INTRAMUSCULAR; INTRAVENOUS EVERY 8 HOURS PRN
Status: DISCONTINUED | OUTPATIENT
Start: 2019-04-23 | End: 2019-04-26 | Stop reason: HOSPADM

## 2019-04-23 RX ADMIN — SODIUM CHLORIDE: 0.9 INJECTION, SOLUTION INTRAVENOUS at 11:04

## 2019-04-23 RX ADMIN — NICOTINE 1 PATCH: 14 PATCH, EXTENDED RELEASE TRANSDERMAL at 10:04

## 2019-04-23 RX ADMIN — PIPERACILLIN SODIUM AND TAZOBACTAM SODIUM 4.5 G: 4; .5 INJECTION, POWDER, LYOPHILIZED, FOR SOLUTION INTRAVENOUS at 11:04

## 2019-04-23 RX ADMIN — FENTANYL CITRATE 75 MCG: 50 INJECTION INTRAMUSCULAR; INTRAVENOUS at 12:04

## 2019-04-23 RX ADMIN — PIPERACILLIN AND TAZOBACTAM 4.5 G: 4; .5 INJECTION, POWDER, LYOPHILIZED, FOR SOLUTION INTRAVENOUS; PARENTERAL at 04:04

## 2019-04-23 RX ADMIN — HYDROMORPHONE HYDROCHLORIDE 1 MG: 2 INJECTION INTRAMUSCULAR; INTRAVENOUS; SUBCUTANEOUS at 11:04

## 2019-04-23 RX ADMIN — RAMELTEON 8 MG: 8 TABLET, FILM COATED ORAL at 10:04

## 2019-04-23 RX ADMIN — SODIUM CHLORIDE: 0.9 INJECTION, SOLUTION INTRAVENOUS at 02:04

## 2019-04-23 RX ADMIN — VANCOMYCIN HYDROCHLORIDE 1500 MG: 1 INJECTION, POWDER, LYOPHILIZED, FOR SOLUTION INTRAVENOUS at 12:04

## 2019-04-23 RX ADMIN — DOCUSATE SODIUM AND SENNOSIDES 1 TABLET: 8.6; 5 TABLET, FILM COATED ORAL at 10:04

## 2019-04-23 RX ADMIN — HYDROMORPHONE HYDROCHLORIDE 1 MG: 2 INJECTION, SOLUTION INTRAMUSCULAR; INTRAVENOUS; SUBCUTANEOUS at 08:04

## 2019-04-23 NOTE — ED PROVIDER NOTES
"Encounter Date: 4/23/2019    SCRIBE #1 NOTE: I, Anita Ariza, am scribing for, and in the presence of, Obie Gross MD.       History     Chief Complaint   Patient presents with    Abdominal Pain       Time seen by provider: 10:49 AM on 04/23/2019    Jacky Ray is a 44 y.o. male with renal disorder who presents to the ED with an onset of lower abdominal pain with associated fever and chills. He reports having a kidney stone and states he feels as if he has "sepsis." The patient was seen at [facility] in [place], [place], admitted for sepsis, transferred to [facility] for a cystoscopy with stent placement performed by Dr. Hernando Hernandez ~3.5 weeks ago on 3/28 but left against medical advice.  The patient is a daily cigarette smoker. He denies any other symptoms at this time. No additional pertinent PSHx noted. Tetanus vaccination and toxoid drug allergy noted.    The history is provided by the patient.     Review of patient's allergies indicates:   Allergen Reactions    Tetanus vaccines and toxoid Swelling     Past Medical History:   Diagnosis Date    Renal disorder     KIDNEY STONES     Past Surgical History:   Procedure Laterality Date    CYSTOSCOPY, WITH URETERAL STENT INSERTION Left 3/28/2019    Performed by [facility] at [facility] OR    ORIF RADIUS & ULNA FRACTURES       History reviewed. No pertinent family history.  Social History     Tobacco Use    Smoking status: Current Every Day Smoker     Packs/day: 1.00     Types: Cigarettes   Substance Use Topics    Alcohol use: Yes     Comment: OCCASIONAL    Drug use: Yes     Types: Marijuana     Review of Systems   Constitutional: Positive for chills and fever.   Respiratory: Negative for cough and shortness of breath.    Cardiovascular: Negative for chest pain.   Gastrointestinal: Positive for abdominal pain (lower). Negative for diarrhea, nausea and vomiting.   Genitourinary: Negative for flank pain.   Skin: Negative for " rash.   Neurological: Negative for headaches.   All other systems reviewed and are negative.    Physical Exam     Initial Vitals [04/23/19 1033]   BP Pulse Resp Temp SpO2   (!) 115/97 (!) 124 14 100.2 °F (37.9 °C) 99 %      MAP       --         Physical Exam    Nursing note and vitals reviewed.  Constitutional: He appears well-developed and well-nourished. He is not diaphoretic.  Non-toxic appearance. He does not have a sickly appearance. He does not appear ill. He appears distressed.   Mild distress.    HENT:   Head: Normocephalic and atraumatic.   Eyes: EOM are normal.   Neck: Normal range of motion. Neck supple. Normal range of motion present. No neck rigidity.   Cardiovascular: Regular rhythm and normal heart sounds. Tachycardia present.  Exam reveals no gallop and no friction rub.    No murmur heard.  Pulmonary/Chest: Breath sounds normal. No respiratory distress. He has no wheezes. He has no rhonchi. He has no rales.   Abdominal: Soft. He exhibits no distension. There is no tenderness. There is no rebound.   No abdominal tenderness   Musculoskeletal: Normal range of motion.   Neurological: He is alert and oriented to person, place, and time.   Skin: Skin is warm and dry. No rash noted.   Multiple tattoos.    Psychiatric: He has a normal mood and affect. His behavior is normal. Judgment and thought content normal.       ED Course   Procedures  Labs Reviewed   CBC W/ AUTO DIFFERENTIAL - Abnormal; Notable for the following components:       Result Value    MPV 7.8 (*)     Gran # (ANC) 9.6 (*)     Mono # 1.1 (*)     Gran% 76.3 (*)     Lymph% 14.0 (*)     All other components within normal limits   COMPREHENSIVE METABOLIC PANEL - Abnormal; Notable for the following components:    Sodium 133 (*)     Albumin 3.4 (*)     All other components within normal limits   URINALYSIS, REFLEX TO URINE CULTURE - Abnormal; Notable for the following components:    Appearance, UA Hazy (*)     Specific Gravity, UA >=1.030 (*)      Protein, UA 2+ (*)     Bilirubin (UA) 1+ (*)     Occult Blood UA 3+ (*)     Nitrite, UA Positive (*)     Urobilinogen, UA 2.0-3.0 (*)     Leukocytes, UA 2+ (*)     All other components within normal limits    Narrative:     Preferred Collection Type->Urine, Clean Catch   URINALYSIS MICROSCOPIC - Abnormal; Notable for the following components:    RBC, UA >100 (*)     WBC, UA >100 (*)     Bacteria, UA Moderate (*)     All other components within normal limits    Narrative:     Preferred Collection Type->Urine, Clean Catch   CULTURE, BLOOD   CULTURE, BLOOD   CULTURE, URINE   LACTIC ACID, PLASMA        Imaging Results          CT Renal Stone Study ABD Pelvis WO (Final result)  Result time 04/23/19 11:58:38    Final result by Valdemar Simms Jr., MD (04/23/19 11:58:38)                 Impression:      There is inflammation around the left kidney extending down the left flank.  A double pigtail stent is noted in place without residual hydronephrosis.  An 11 mm stone remains in the left renal pelvis.      Electronically signed by: Valdemar Simms MD  Date:    04/23/2019  Time:    11:58             Narrative:    EXAMINATION:  CT RENAL STONE STUDY ABD PELVIS WO    CLINICAL HISTORY:  Flank pain, stone disease suspected;    TECHNIQUE:  Low dose axial images, sagittal and coronal reformations were obtained from the lung bases to the pubic symphysis.  Contrast was not administered.    COMPARISON:  Prior CT of March 28, 2019.    FINDINGS:  The liver is of normal size contour and CT density without focal mass.  The gallbladder is of normal size without CT evidence of stone.  The pancreas is of normal contour and CT density without edema or mass.  The spleen is of normal size and CT density.    The adrenal glands are not enlarged.  The kidneys in general are of normal size and CT density.  On the left there is inflammation around the kidney and a double pigtail stent with extends from the  caliceal system into the bladder.   There remains a stone in the left renal pelvis adjacent to the stent which measures 11 mm.  Other left intrarenal stone or right intrarenal stone or ureteral stone is not seen.    The abdominal aorta and inferior vena cava are of normal caliber.  Retroperitoneal adenopathy is not seen.  The gastrointestinal organs appear normal without dilatation air-fluid levels or soft tissue masses.  No free fluid is seen.  A normal appendix is noted.    Within the pelvis the bladder is of normal contour without asymmetry.  The prostate contains calcifications but is not enlarged.                               X-Ray Chest AP Portable (Final result)  Result time 04/23/19 11:01:40    Final result by Valdemar Simms Jr., MD (04/23/19 11:01:40)                 Impression:      No acute abnormality.      Electronically signed by: Valdemar Simms MD  Date:    04/23/2019  Time:    11:01             Narrative:    EXAMINATION:  XR CHEST AP PORTABLE    CLINICAL HISTORY:  Sepsis;    TECHNIQUE:  Single frontal view of the chest was performed.    COMPARISON:  None    FINDINGS:  The lungs are clear, with normal appearance of pulmonary vasculature and no pleural effusion or pneumothorax.    The cardiac silhouette is normal in size. The hilar and mediastinal contours are unremarkable.    Bones are intact.                                 Medical Decision Making:   History:   Old Medical Records: I decided to obtain old medical records.  Clinical Tests:   Lab Tests: Ordered and Reviewed  Radiological Study: Reviewed and Ordered            Scribe Attestation:   Scribe #1: I performed the above scribed service and the documentation accurately describes the services I performed. I attest to the accuracy of the note.    I, Dr. Gross, personally performed the services described in this documentation. All medical record entries made by the scribe were at my direction and in my presence.  I have reviewed the chart and agree that the record  reflects my personal performance and is accurate and complete.2:11 PM 04/23/2019            ED Course as of Apr 23 1410   Tue Apr 23, 2019   1046 BP(!): 115/97 [EF]   1046 Temp: 100.2 °F (37.9 °C) [EF]   1046 Temp src: Oral [EF]   1046 Pulse(!): 124 [EF]   1046 Resp: 14 [EF]   1046 SpO2: 99 % [EF]   1152 RBC, UA(!): >100 [EF]   1152 WBC, UA(!): >100 [EF]   1152 Bacteria, UA(!): Moderate [EF]   1152 Nitrite, UA(!): Positive [EF]   1152 Leukocytes, UA(!): 2+ [EF]   1202 CT Renal Stone Study ABD Pelvis WO [EF]   1250 Dr saldana to admit    [EF]      ED Course User Index  [EF] Obei Gross MD     Clinical Impression:       ICD-10-CM ICD-9-CM   1. Kidney stone on left side N20.0 592.0         Disposition:   Disposition: Admitted         44-year-old presents to the ER with continued flank pain and fever.  Hospitalized here several weeks ago for an infected kidney stone, patient had a ureteral stent placed but then left against medical advice.  He has been without follow-up since then.  Low-grade temperature here in the ER.  Urine continues to appear infected.  Case discussed with Urology Dr. Borden who recommends broad-spectrum antibiotics admit the patient make him NPO.  [facility] to admit.               Obie Gross MD  04/23/19 1412       Obie Gross MD  04/23/19 1412

## 2019-04-23 NOTE — HPI
"  Jacky Ray is a 44 y.o. male with renal disorder who presents to the ED with an onset of lower abdominal pain with associated fever and chills. He reports having a kidney stone and states he feels as if he has "sepsis." The patient was seen at [facility] in [place], [place], admitted for sepsis, transferred to [facility] for a cystoscopy with stent placement performed by Dr. Hernando Hernandez ~3.5 weeks ago on 3/28 but left against medical advice.  The patient is a daily cigarette smoker. He denies any other symptoms at this time. No additional pertinent PSHx noted. Tetanus vaccination and toxoid drug allergy noted    Patient is somewhat groggy after pain medication when I saw him and reports he has had left flank pain for 2 weeks but did not seek medical attention.  He reports that the fever was which concerned him to come in today.  He has had no nausea vomiting or diarrhea.  Has had severe dysuria.  Denies hematuria.        "

## 2019-04-23 NOTE — PROGRESS NOTES
04/23/19 1619   Patient Assessment/Suction   Level of Consciousness (AVPU) alert   PRE-TX-O2   O2 Device (Oxygen Therapy) room air   SpO2 97 %   Pulse Oximetry Type Intermittent   $ Pulse Oximetry - Multiple Charge Pulse Oximetry - Multiple   Pulse 85   Resp 18

## 2019-04-23 NOTE — ED PROVIDER NOTES
"Encounter Date: 4/23/2019       History     Chief Complaint   Patient presents with    Nephrolithiasis     seen on 3/28 had stent put in at [facility] got mad and walked out, still has stent in     History provided from EMR as pt is not cooperative and will only scream "I'm in pain bitch, why you asking questions. Shut the fuck up and give me medicine."    45yo male with pmh nephrolithiasis - seen on 3/28 with ureteral stent placed at [facility] - presents to ED for evaluation of "kidney stones." On 3/28/19 initially presented to [facility] with severe left flank pain with CT scan revealing moderate left-sided hydronephrosis secondary to a prominent partially obstructing left ureteropelvic junction calculus. Urology was unavailable and the patient was transferred here to [facility] urological consultation and intervention. He is status post left retrograde pyelogram including ureteral catheter placement on 03/28/2019. Patient left hospital AMA on 3/30/19.           Review of patient's allergies indicates:   Allergen Reactions    Tetanus vaccines and toxoid Swelling     Past Medical History:   Diagnosis Date    Renal disorder     KIDNEY STONES     Past Surgical History:   Procedure Laterality Date    CYSTOSCOPY, WITH URETERAL STENT INSERTION Left 3/28/2019    Performed by [facility] MD at [facility] OR    ORIF RADIUS & ULNA FRACTURES       History reviewed. No pertinent family history.  Social History     Tobacco Use    Smoking status: Current Every Day Smoker     Packs/day: 1.00     Types: Cigarettes   Substance Use Topics    Alcohol use: Yes     Comment: OCCASIONAL    Drug use: Yes     Types: Marijuana     Review of Systems   Unable to perform ROS: Other (uncooperative)       Physical Exam     Initial Vitals [04/23/19 0936]   BP Pulse Resp Temp SpO2   (!) 120/98 98 20 98.2 °F (36.8 °C) 98 %      MAP       --         Physical Exam    Nursing note and vitals reviewed.  Constitutional: " "Vital signs are normal. He is uncooperative.   HENT:   Head: Normocephalic and atraumatic.   Cardiovascular: Normal rate.   Pulmonary/Chest: No respiratory distress.   Neurological: He is alert and oriented to person, place, and time.   Psychiatric: His speech is normal. His affect is angry and inappropriate. He is agitated and aggressive. He expresses impulsivity and inappropriate judgment.         ED Course   Procedures  Labs Reviewed   URINALYSIS, REFLEX TO URINE CULTURE   CBC W/ AUTO DIFFERENTIAL   COMPREHENSIVE METABOLIC PANEL          Imaging Results    None          Medical Decision Making:   Differential Diagnosis:   Acute nephrolithiasis, hx of ureteral stent placement  ED Management:  Labs, medication, and imaging ordered for this pt. However, during interview and attempting to exam, the pt became hostile screaming at all staff "fuck you bitches, fuck ya'll, I'm going to [place]." Attempted to discuss workup as well as concern for infection should his ureteral stent still be in place. The patient slammed the door open and walked out of the ED.                       Clinical Impression:       ICD-10-CM ICD-9-CM   1. History of nephrolithiasis Z87.442 V13.01         Disposition:   Disposition: TI Hurt MD  04/23/19 1009    "

## 2019-04-23 NOTE — SUBJECTIVE & OBJECTIVE
Past Medical History:   Diagnosis Date    Renal disorder     KIDNEY STONES       Past Surgical History:   Procedure Laterality Date    CYSTOSCOPY, WITH URETERAL STENT INSERTION Left 3/28/2019    Performed by [facility] MD at [facility] OR    ORIF RADIUS & ULNA FRACTURES         Review of patient's allergies indicates:   Allergen Reactions    Tetanus vaccines and toxoid Swelling       Current Facility-Administered Medications on File Prior to Encounter   Medication    [DISCONTINUED] ketorolac injection 30 mg    [DISCONTINUED] sodium chloride 0.9% bolus 1,000 mL     No current outpatient medications on file prior to encounter.     Family History     None        Tobacco Use    Smoking status: Current Every Day Smoker     Packs/day: 1.00     Types: Cigarettes   Substance and Sexual Activity    Alcohol use: Yes     Comment: OCCASIONAL    Drug use: Yes     Types: Marijuana    Sexual activity: Yes     Partners: Female     Review of Systems   Constitutional: Positive for fever. Negative for chills and unexpected weight change.   HENT: Negative for congestion and sore throat.    Eyes: Negative for discharge and itching.   Respiratory: Negative for cough and shortness of breath.    Cardiovascular: Negative for chest pain and leg swelling.   Gastrointestinal: Negative for abdominal distention and abdominal pain.   Endocrine: Negative for cold intolerance and heat intolerance.   Genitourinary: Positive for difficulty urinating, dysuria and flank pain. Negative for discharge, hematuria, penile swelling, scrotal swelling and testicular pain.   Musculoskeletal: Positive for back pain. Negative for arthralgias.   Skin: Negative for pallor and rash.   Allergic/Immunologic: Negative for environmental allergies and food allergies.   Neurological: Positive for weakness. Negative for dizziness.   Hematological: Negative for adenopathy. Does not bruise/bleed easily.   Psychiatric/Behavioral: Positive for agitation and  dysphoric mood. Negative for decreased concentration.     Objective:     Vital Signs (Most Recent):  Temp: 97.7 °F (36.5 °C) (04/23/19 1523)  Pulse: 85 (04/23/19 1619)  Resp: 18 (04/23/19 1619)  BP: 109/67 (04/23/19 1523)  SpO2: 97 % (04/23/19 1619) Vital Signs (24h Range):  Temp:  [97.7 °F (36.5 °C)-100.2 °F (37.9 °C)] 97.7 °F (36.5 °C)  Pulse:  [] 85  Resp:  [14-20] 18  SpO2:  [94 %-99 %] 97 %  BP: (109-120)/(67-98) 109/67     Weight: 66.7 kg (147 lb)  Body mass index is 25.23 kg/m².    Physical Exam   Constitutional: He is oriented to person, place, and time. He appears well-developed and well-nourished. No distress.   Comfortable after receiving pain medication   HENT:   Head: Normocephalic and atraumatic.   Right Ear: External ear normal.   Left Ear: External ear normal.   Nose: Nose normal.   Mouth/Throat: Oropharynx is clear and moist. No oropharyngeal exudate.   Eyes: Conjunctivae and EOM are normal. Right eye exhibits no discharge. Left eye exhibits no discharge. No scleral icterus.   Neck: Normal range of motion. Neck supple. No thyromegaly present.   Cardiovascular: Normal rate, regular rhythm, normal heart sounds and intact distal pulses. Exam reveals no gallop and no friction rub.   No murmur heard.  Pulmonary/Chest: Effort normal and breath sounds normal. No respiratory distress. He has no wheezes.   Abdominal: Soft. He exhibits no distension and no mass. Bowel sounds are decreased. There is tenderness in the left lower quadrant. There is CVA tenderness. There is no rebound and no guarding.   Genitourinary:   Genitourinary Comments: Diaz in place with dark urine.   Musculoskeletal: Normal range of motion. He exhibits no edema or tenderness.   Lymphadenopathy:     He has no cervical adenopathy.   Neurological: He is alert and oriented to person, place, and time. No cranial nerve deficit. Coordination normal.   Skin: Skin is warm and dry. No rash noted. No erythema.   Psychiatric: His speech is  normal. Thought content normal. His mood appears anxious. He is agitated. Cognition and memory are normal.   Nursing note and vitals reviewed.        CRANIAL NERVES     CN III, IV, VI   Extraocular motions are normal.        Significant Labs:   BMP:   Recent Labs   Lab 04/23/19  1105      *   K 4.2      CO2 23   BUN 10   CREATININE 1.3   CALCIUM 9.8     CBC:   Recent Labs   Lab 04/23/19  1105   WBC 12.60   HGB 14.7   HCT 44.4          Significant Imaging: I have reviewed and interpreted all pertinent imaging results/findings within the past 24 hours.

## 2019-04-23 NOTE — ED NOTES
"Pt rushed out of room screaming "Ya'll bull shittin I'm going to [place]. Fuck ya'll bitches!.  Attempt to speak with patient. Pt refusing yelling "Fuck you". Pt continues storming out of unit. Pt reached door near triage without hitting exit button. Pt begins yelling at staff "Open this door bitch or I'm going to beat someones ass in here." Door opened and attempt to de escalate situation but patient continues yelling at staff stating "Yall bitches come outside and I'll whoop all of ya'll." Pt continued off premises. Significant other waiting in [facility] followed behind patient apologizing for his actions.  Registration ordered to notify Security and have patient escorted off premises if he returns.    "

## 2019-04-23 NOTE — NURSING
Spoke with Magdalena RN in ED who said that the MD in ED spoke with Dr. Hernandez regarding urology consult for patient.

## 2019-04-24 PROCEDURE — 25000003 PHARM REV CODE 250: Performed by: HOSPITALIST

## 2019-04-24 PROCEDURE — 99223 PR INITIAL HOSPITAL CARE,LEVL III: ICD-10-PCS | Mod: ,,, | Performed by: UROLOGY

## 2019-04-24 PROCEDURE — 63600175 PHARM REV CODE 636 W HCPCS: Performed by: HOSPITALIST

## 2019-04-24 PROCEDURE — 99223 1ST HOSP IP/OBS HIGH 75: CPT | Mod: ,,, | Performed by: UROLOGY

## 2019-04-24 PROCEDURE — 94761 N-INVAS EAR/PLS OXIMETRY MLT: CPT

## 2019-04-24 PROCEDURE — 12000002 HC ACUTE/MED SURGE SEMI-PRIVATE ROOM

## 2019-04-24 PROCEDURE — S4991 NICOTINE PATCH NONLEGEND: HCPCS | Performed by: HOSPITALIST

## 2019-04-24 PROCEDURE — 25000003 PHARM REV CODE 250: Performed by: NURSE PRACTITIONER

## 2019-04-24 RX ORDER — PHENAZOPYRIDINE HYDROCHLORIDE 100 MG/1
100 TABLET, FILM COATED ORAL
Status: DISCONTINUED | OUTPATIENT
Start: 2019-04-24 | End: 2019-04-26 | Stop reason: HOSPADM

## 2019-04-24 RX ADMIN — NICOTINE 1 PATCH: 14 PATCH, EXTENDED RELEASE TRANSDERMAL at 09:04

## 2019-04-24 RX ADMIN — VANCOMYCIN HYDROCHLORIDE 750 MG: 1 INJECTION, POWDER, FOR SOLUTION INTRAVENOUS at 12:04

## 2019-04-24 RX ADMIN — HYDROMORPHONE HYDROCHLORIDE 1 MG: 2 INJECTION, SOLUTION INTRAMUSCULAR; INTRAVENOUS; SUBCUTANEOUS at 09:04

## 2019-04-24 RX ADMIN — PIPERACILLIN SODIUM AND TAZOBACTAM SODIUM 4.5 G: 4; .5 INJECTION, POWDER, LYOPHILIZED, FOR SOLUTION INTRAVENOUS at 05:04

## 2019-04-24 RX ADMIN — PIPERACILLIN SODIUM AND TAZOBACTAM SODIUM 4.5 G: 4; .5 INJECTION, POWDER, LYOPHILIZED, FOR SOLUTION INTRAVENOUS at 09:04

## 2019-04-24 RX ADMIN — PHENAZOPYRIDINE HYDROCHLORIDE 100 MG: 100 TABLET ORAL at 02:04

## 2019-04-24 RX ADMIN — HYDROMORPHONE HYDROCHLORIDE 1 MG: 2 INJECTION, SOLUTION INTRAMUSCULAR; INTRAVENOUS; SUBCUTANEOUS at 01:04

## 2019-04-24 RX ADMIN — HYDROMORPHONE HYDROCHLORIDE 1 MG: 2 INJECTION, SOLUTION INTRAMUSCULAR; INTRAVENOUS; SUBCUTANEOUS at 05:04

## 2019-04-24 RX ADMIN — DOCUSATE SODIUM AND SENNOSIDES 1 TABLET: 8.6; 5 TABLET, FILM COATED ORAL at 09:04

## 2019-04-24 RX ADMIN — HYDROMORPHONE HYDROCHLORIDE 1 MG: 2 INJECTION, SOLUTION INTRAMUSCULAR; INTRAVENOUS; SUBCUTANEOUS at 10:04

## 2019-04-24 NOTE — PROGRESS NOTES
Notified earlier of pt in er known to me with stone and urosepsis and meth use and AMA-ed last admit after stent without antibiotics    Last fever 10am. Got vanc/zosyn x1. Needs further orders. Has chan. Cannot intervene with acute infection on stone so doesn't need to be NPO after MN. Would recommend keeping inpt until approp abx treatment so stone can be treated. If does not respond, may need stent exchange due to colonization/sepsis. Would need 48h abx before considering    Cont broad spec abx  Can eat  Will see pt tomorrow

## 2019-04-24 NOTE — ASSESSMENT & PLAN NOTE
By history.  Denies current usage.  May be having mild symptoms of withdrawal and will treat symptomatically.

## 2019-04-24 NOTE — CONSULTS
Jacky Ray [id number] is a 44 y.o. male who has been consulted for vancomycin dosing.    The patient has the following labs:     Date Creatinine (mg/dl)    BUN WBC Count   4/24/2019 Estimated Creatinine Clearance: 60.1 mL/min (based on SCr of 1.3 mg/dL). Lab Results   Component Value Date    BUN 10 04/23/2019     Lab Results   Component Value Date    WBC 12.60 04/23/2019        Current weight is 66.7 kg (147 lb)      The patient received  1500 mg on 4/23 at 1213    The patient will be started on vancomycin at a dose of 750 mg every 12 hours (11 mg/kg/dose).  The vancomycin trough has been ordered for 4/25 at 1130.      Patient will be followed by pharmacy for changes in renal function, toxicity, and efficacy.   Thank you for allowing us to participate in this patient's care.     Selena Morales

## 2019-04-24 NOTE — ASSESSMENT & PLAN NOTE
Chronic and not causing hydronephrosis.  Urology has been consulted.  Ureteral Stents are in place.--in the left ureter.

## 2019-04-24 NOTE — H&P
"[facility] Medicine  History & Physical    Patient Name: Jacky Ray  MRN: [id number]  Admission Date: 4/23/2019  Attending Physician: Zohra Ridley MD   Primary Care Provider: Primary Doctor No         Patient information was obtained from patient, past medical records and ER records.     Subjective:     Principal Problem:Acute pyelonephritis    Chief Complaint:   Chief Complaint   Patient presents with    Abdominal Pain        HPI:   Jacky Ray is a 44 y.o. male with renal disorder who presents to the ED with an onset of lower abdominal pain with associated fever and chills. He reports having a kidney stone and states he feels as if he has "sepsis." The patient was seen at [facility] in [place], [place], admitted for sepsis, transferred to [facility] for a cystoscopy with stent placement performed by Dr. Hernando Hernandez ~3.5 weeks ago on 3/28 but left against medical advice.  The patient is a daily cigarette smoker. He denies any other symptoms at this time. No additional pertinent PSHx noted. Tetanus vaccination and toxoid drug allergy noted    Patient is somewhat groggy after pain medication when I saw him and reports he has had left flank pain for 2 weeks but did not seek medical attention.  He reports that the fever was which concerned him to come in today.  He has had no nausea vomiting or diarrhea.  Has had severe dysuria.  Denies hematuria.          Past Medical History:   Diagnosis Date    Renal disorder     KIDNEY STONES       Past Surgical History:   Procedure Laterality Date    CYSTOSCOPY, WITH URETERAL STENT INSERTION Left 3/28/2019    Performed by [facility] at [facility] OR    ORIF RADIUS & ULNA FRACTURES         Review of patient's allergies indicates:   Allergen Reactions    Tetanus vaccines and toxoid Swelling       Current Facility-Administered Medications on File Prior to Encounter   Medication    [DISCONTINUED] ketorolac " injection 30 mg    [DISCONTINUED] sodium chloride 0.9% bolus 1,000 mL     No current outpatient medications on file prior to encounter.     Family History     None        Tobacco Use    Smoking status: Current Every Day Smoker     Packs/day: 1.00     Types: Cigarettes   Substance and Sexual Activity    Alcohol use: Yes     Comment: OCCASIONAL    Drug use: Yes     Types: Marijuana    Sexual activity: Yes     Partners: Female     Review of Systems   Constitutional: Positive for fever. Negative for chills and unexpected weight change.   HENT: Negative for congestion and sore throat.    Eyes: Negative for discharge and itching.   Respiratory: Negative for cough and shortness of breath.    Cardiovascular: Negative for chest pain and leg swelling.   Gastrointestinal: Negative for abdominal distention and abdominal pain.   Endocrine: Negative for cold intolerance and heat intolerance.   Genitourinary: Positive for difficulty urinating, dysuria and flank pain. Negative for discharge, hematuria, penile swelling, scrotal swelling and testicular pain.   Musculoskeletal: Positive for back pain. Negative for arthralgias.   Skin: Negative for pallor and rash.   Allergic/Immunologic: Negative for environmental allergies and food allergies.   Neurological: Positive for weakness. Negative for dizziness.   Hematological: Negative for adenopathy. Does not bruise/bleed easily.   Psychiatric/Behavioral: Positive for agitation and dysphoric mood. Negative for decreased concentration.     Objective:     Vital Signs (Most Recent):  Temp: 97.7 °F (36.5 °C) (04/23/19 1523)  Pulse: 85 (04/23/19 1619)  Resp: 18 (04/23/19 1619)  BP: 109/67 (04/23/19 1523)  SpO2: 97 % (04/23/19 1619) Vital Signs (24h Range):  Temp:  [97.7 °F (36.5 °C)-100.2 °F (37.9 °C)] 97.7 °F (36.5 °C)  Pulse:  [] 85  Resp:  [14-20] 18  SpO2:  [94 %-99 %] 97 %  BP: (109-120)/(67-98) 109/67     Weight: 66.7 kg (147 lb)  Body mass index is 25.23 kg/m².    Physical  Exam   Constitutional: He is oriented to person, place, and time. He appears well-developed and well-nourished. No distress.   Comfortable after receiving pain medication   HENT:   Head: Normocephalic and atraumatic.   Right Ear: External ear normal.   Left Ear: External ear normal.   Nose: Nose normal.   Mouth/Throat: Oropharynx is clear and moist. No oropharyngeal exudate.   Eyes: Conjunctivae and EOM are normal. Right eye exhibits no discharge. Left eye exhibits no discharge. No scleral icterus.   Neck: Normal range of motion. Neck supple. No thyromegaly present.   Cardiovascular: Normal rate, regular rhythm, normal heart sounds and intact distal pulses. Exam reveals no gallop and no friction rub.   No murmur heard.  Pulmonary/Chest: Effort normal and breath sounds normal. No respiratory distress. He has no wheezes.   Abdominal: Soft. He exhibits no distension and no mass. Bowel sounds are decreased. There is tenderness in the left lower quadrant. There is CVA tenderness. There is no rebound and no guarding.   Genitourinary:   Genitourinary Comments: Diaz in place with dark urine.   Musculoskeletal: Normal range of motion. He exhibits no edema or tenderness.   Lymphadenopathy:     He has no cervical adenopathy.   Neurological: He is alert and oriented to person, place, and time. No cranial nerve deficit. Coordination normal.   Skin: Skin is warm and dry. No rash noted. No erythema.   Psychiatric: His speech is normal. Thought content normal. His mood appears anxious. He is agitated. Cognition and memory are normal.   Nursing note and vitals reviewed.        CRANIAL NERVES     CN III, IV, VI   Extraocular motions are normal.        Significant Labs:   BMP:   Recent Labs   Lab 04/23/19  1105      *   K 4.2      CO2 23   BUN 10   CREATININE 1.3   CALCIUM 9.8     CBC:   Recent Labs   Lab 04/23/19  1105   WBC 12.60   HGB 14.7   HCT 44.4          Significant Imaging: I have reviewed and  interpreted all pertinent imaging results/findings within the past 24 hours.    Assessment/Plan:     * Acute pyelonephritis  Acute pyelonephritis and retained left ureteral stone without hydronephrosis.  His white count was normal.  Blood and urine cultures obtained in the ED and then subsequently patient was given Zosyn and vancomycin which will be continued.  He does not meet sepsis criteria.  He had close the Klebsiella in his urine 1 month prior which is pansensitive except for nitrofurantoin  Blood in urine cultures have been obtained.  There is inflammation around the left kidney and CT scan.  An 11 mm stone remains in the left renal pelvis without hydronephrosis.      Kidney stone on left side  Chronic and not causing hydronephrosis.  Urology has been consulted.  Ureteral Stents are in place.--in the left ureter.      Ureteral stone  See above.      Nicotine dependence  Patient was groggy from pain medication.  Will be counseled and advised to discontinue--benefits to be discussed.      Hyponatremia  Acute in likely due to volume depletion.  This is mild and will be monitored with daily labs.  He is currently on normal saline         VTE Risk Mitigation (From admission, onward)        Ordered     IP VTE LOW RISK PATIENT  Once      04/23/19 2044     Place UVALDO storye  Until discontinued      04/23/19 1442             Zohra Ridley MD  Department of Hospital Medicine   [facility]

## 2019-04-24 NOTE — ASSESSMENT & PLAN NOTE
Patient was groggy from pain medication.  Will be counseled and advised to discontinue--benefits to be discussed.  Nicotine patch has been placed.

## 2019-04-24 NOTE — SUBJECTIVE & OBJECTIVE
Interval History:   Patient seen and examined.  His main concern is removal of the stent and stone.  He also wants a nicotine patch.  He has some tremors in of some diaphoresis.    Review of Systems   Constitutional: Positive for fever. Negative for chills and unexpected weight change.   HENT: Negative for congestion and sore throat.    Eyes: Negative for discharge and itching.   Respiratory: Negative for cough and shortness of breath.    Cardiovascular: Negative for chest pain and leg swelling.   Gastrointestinal: Negative for abdominal distention and abdominal pain.   Endocrine: Negative for cold intolerance and heat intolerance.   Genitourinary: Positive for dysuria and flank pain. Negative for difficulty urinating, discharge, hematuria, penile swelling, scrotal swelling and testicular pain.   Musculoskeletal: Positive for back pain. Negative for arthralgias.   Skin: Negative for pallor and rash.   Allergic/Immunologic: Negative for environmental allergies and food allergies.   Neurological: Positive for weakness. Negative for dizziness.   Hematological: Negative for adenopathy. Does not bruise/bleed easily.   Psychiatric/Behavioral: Positive for agitation and dysphoric mood. Negative for decreased concentration.     Objective:     Vital Signs (Most Recent):  Temp: 98.6 °F (37 °C) (04/24/19 1543)  Pulse: 79 (04/24/19 1543)  Resp: 18 (04/24/19 1543)  BP: (!) 117/53 (04/24/19 1543)  SpO2: 97 % (04/24/19 1709) Vital Signs (24h Range):  Temp:  [97 °F (36.1 °C)-100.2 °F (37.9 °C)] 98.6 °F (37 °C)  Pulse:  [75-90] 79  Resp:  [16-20] 18  SpO2:  [96 %-98 %] 97 %  BP: (104-127)/(53-70) 117/53     Weight: 66.7 kg (147 lb)  Body mass index is 25.23 kg/m².    Intake/Output Summary (Last 24 hours) at 4/24/2019 1742  Last data filed at 4/24/2019 0600  Gross per 24 hour   Intake 1305 ml   Output 700 ml   Net 605 ml      Physical Exam   Constitutional: He is oriented to person, place, and time. He appears well-developed and  well-nourished. No distress.   Comfortable    HENT:   Head: Normocephalic and atraumatic.   Right Ear: External ear normal.   Left Ear: External ear normal.   Nose: Nose normal.   Mouth/Throat: Oropharynx is clear and moist. No oropharyngeal exudate.   Eyes: Conjunctivae are normal. Right eye exhibits no discharge. Left eye exhibits no discharge. No scleral icterus.   Neck: No thyromegaly present.   Cardiovascular: Normal rate, regular rhythm, normal heart sounds and intact distal pulses. Exam reveals no gallop and no friction rub.   No murmur heard.  Pulmonary/Chest: Effort normal and breath sounds normal. No respiratory distress. He has no wheezes.   Abdominal: Soft. Bowel sounds are normal. He exhibits no distension and no mass. There is tenderness in the left lower quadrant. There is CVA tenderness. There is no rebound and no guarding.   Genitourinary:   Genitourinary Comments: Diaz in place with dark urine.   Musculoskeletal: He exhibits no edema or tenderness.   Lymphadenopathy:     He has no cervical adenopathy.   Neurological: He is alert and oriented to person, place, and time. No cranial nerve deficit. Coordination normal.   Skin: Skin is warm and dry. No rash noted. No erythema.   Psychiatric: His speech is normal. Thought content normal. His mood appears anxious. He is agitated. Cognition and memory are normal.   Nursing note and vitals reviewed.      Significant Labs: All pertinent labs within the past 24 hours have been reviewed.    Significant Imaging: I have reviewed and interpreted all pertinent imaging results/findings within the past 24 hours.

## 2019-04-24 NOTE — ASSESSMENT & PLAN NOTE
Patient was groggy from pain medication.  Will be counseled and advised to discontinue--benefits to be discussed.

## 2019-04-24 NOTE — PLAN OF CARE
Problem: Adult Inpatient Plan of Care  Goal: Plan of Care Review  Outcome: Ongoing (interventions implemented as appropriate)  Plan of care reviewed with pt. Pt verbalized understanding. Safety precautions maintained. Wheels locked and bed  in low position. Call light within reach. Diaz intact and patent draining garth urine to  bag. Telemetry  monitoring in place. Low risk VTE.  C/o pain controlled with PRN pain medication. Purposeful hourly rounding done. IV site free of redness or swelling with antibiotics infusing.Remains afebrile this shift.Strict intake and output recorded. Fluids encouraged. Denies c/o pain at this time.

## 2019-04-24 NOTE — NURSING
Situation-   Who are you? Who is the patient? What is going on with the patient currently?          I am Lauren E Abercrombie calling from in regards to Jacky Ray who is a 44 y.o. year old male admitted with <principal problem not specified> who...   Background- What is the patient's significant medical history (CAD, ESRD, HIV positive, history of recent surgery/chemo.transfusion) and recent labs Has a past medical history of   Past Medical History:   Diagnosis Date    Renal disorder     KIDNEY STONES   . Most recent vitals include  Temp:  [97.7 °F (36.5 °C)-100.2 °F (37.9 °C)]   Pulse:  []   Resp:  [14-20]   BP: (109-120)/(64-98)   SpO2:  [94 %-99 %]  and labs          Assessment-   Has the following issues (abnormal labs, abnormal vitals, change in status, uncontrolled symptoms, patient request) Who is having and extreme urge to smoke. Also patient states he is thirsty and hungry. I spoke with Dr Hernandez who states he will see patient tomorrow and from his stand point ok for patient to eat.     Also there are no current orders for IV abx and Dr Hernandez would like for the hospitalist to address please.   Recommendation- What is the change in the plan of care requested? Do you think we should get a nicotine patch? Also is it okay for him to have a diet?   Plan-  What did we decide to do? Nicotine patch orders. Diet placed. Abx orders in.

## 2019-04-24 NOTE — ASSESSMENT & PLAN NOTE
Acute in likely due to volume depletion.  This is mild and will be monitored with daily labs.  He is currently on normal saline -will continue and monitor renal function

## 2019-04-24 NOTE — PLAN OF CARE
Problem: Adult Inpatient Plan of Care  Goal: Plan of Care Review  Outcome: Ongoing (interventions implemented as appropriate)  AAOx4. Plan of care reviewed with patient. Safety maintained throughout shift. Bed locked and low, SRx2, call light within reach. IVF and IV abx infusing per orders. PRN medication given for c/o pain. Low risk VTE. Diaz to gravity, dark color. Patient with pain/bladder complaints, new orders received, pt resting well. Telemetry monitoring, SR. Low grade fevers throughout the night, not high enough to medicate per PRN orders. Hourly/q2 rounding completed this shift for pt safety. Will continue to monitor.

## 2019-04-24 NOTE — ASSESSMENT & PLAN NOTE
Chronic and not causing hydronephrosis.  Urology has been consulted.  Ureteral Stents are in place.--in the left ureter.  Discussed with Urology and plan is to remove with laser or lithotripsy once infection is cleared.  Will continue to monitor and follow up urine culture.

## 2019-04-24 NOTE — PROGRESS NOTES
"[facility] Medicine  Progress Note    Patient Name: Jacky Ray  MRN: [id number]  Patient Class: IP- Inpatient   Admission Date: 4/23/2019  Length of Stay: 1 days  Attending Physician: Zohra Ridley MD  Primary Care Provider: Primary Doctor No        Subjective:     Principal Problem:Acute pyelonephritis    HPI:    Jacky Ray is a 44 y.o. male with renal disorder who presents to the ED with an onset of lower abdominal pain with associated fever and chills. He reports having a kidney stone and states he feels as if he has "sepsis." The patient was seen at [facility] in [place], [place], admitted for sepsis, transferred to [facility] for a cystoscopy with stent placement performed by Dr. Hernando Hernandez ~3.5 weeks ago on 3/28 but left against medical advice.  The patient is a daily cigarette smoker. He denies any other symptoms at this time. No additional pertinent PSHx noted. Tetanus vaccination and toxoid drug allergy noted    Patient is somewhat groggy after pain medication when I saw him and reports he has had left flank pain for 2 weeks but did not seek medical attention.  He reports that the fever was which concerned him to come in today.  He has had no nausea vomiting or diarrhea.  Has had severe dysuria.  Denies hematuria.          Hospital Course:  No notes on file    Interval History:   Patient seen and examined.  His main concern is removal of the stent and stone.  He also wants a nicotine patch.  He has some tremors in of some diaphoresis.    Review of Systems   Constitutional: Positive for fever. Negative for chills and unexpected weight change.   HENT: Negative for congestion and sore throat.    Eyes: Negative for discharge and itching.   Respiratory: Negative for cough and shortness of breath.    Cardiovascular: Negative for chest pain and leg swelling.   Gastrointestinal: Negative for abdominal distention and abdominal pain.   Endocrine: " Negative for cold intolerance and heat intolerance.   Genitourinary: Positive for dysuria and flank pain. Negative for difficulty urinating, discharge, hematuria, penile swelling, scrotal swelling and testicular pain.   Musculoskeletal: Positive for back pain. Negative for arthralgias.   Skin: Negative for pallor and rash.   Allergic/Immunologic: Negative for environmental allergies and food allergies.   Neurological: Positive for weakness. Negative for dizziness.   Hematological: Negative for adenopathy. Does not bruise/bleed easily.   Psychiatric/Behavioral: Positive for agitation and dysphoric mood. Negative for decreased concentration.     Objective:     Vital Signs (Most Recent):  Temp: 98.6 °F (37 °C) (04/24/19 1543)  Pulse: 79 (04/24/19 1543)  Resp: 18 (04/24/19 1543)  BP: (!) 117/53 (04/24/19 1543)  SpO2: 97 % (04/24/19 1709) Vital Signs (24h Range):  Temp:  [97 °F (36.1 °C)-100.2 °F (37.9 °C)] 98.6 °F (37 °C)  Pulse:  [75-90] 79  Resp:  [16-20] 18  SpO2:  [96 %-98 %] 97 %  BP: (104-127)/(53-70) 117/53     Weight: 66.7 kg (147 lb)  Body mass index is 25.23 kg/m².    Intake/Output Summary (Last 24 hours) at 4/24/2019 1742  Last data filed at 4/24/2019 0600  Gross per 24 hour   Intake 1305 ml   Output 700 ml   Net 605 ml      Physical Exam   Constitutional: He is oriented to person, place, and time. He appears well-developed and well-nourished. No distress.   Comfortable    HENT:   Head: Normocephalic and atraumatic.   Right Ear: External ear normal.   Left Ear: External ear normal.   Nose: Nose normal.   Mouth/Throat: Oropharynx is clear and moist. No oropharyngeal exudate.   Eyes: Conjunctivae are normal. Right eye exhibits no discharge. Left eye exhibits no discharge. No scleral icterus.   Neck: No thyromegaly present.   Cardiovascular: Normal rate, regular rhythm, normal heart sounds and intact distal pulses. Exam reveals no gallop and no friction rub.   No murmur heard.  Pulmonary/Chest: Effort normal and  breath sounds normal. No respiratory distress. He has no wheezes.   Abdominal: Soft. Bowel sounds are normal. He exhibits no distension and no mass. There is tenderness in the left lower quadrant. There is CVA tenderness. There is no rebound and no guarding.   Genitourinary:   Genitourinary Comments: Diaz in place with dark urine.   Musculoskeletal: He exhibits no edema or tenderness.   Lymphadenopathy:     He has no cervical adenopathy.   Neurological: He is alert and oriented to person, place, and time. No cranial nerve deficit. Coordination normal.   Skin: Skin is warm and dry. No rash noted. No erythema.   Psychiatric: His speech is normal. Thought content normal. His mood appears anxious. He is agitated. Cognition and memory are normal.   Nursing note and vitals reviewed.      Significant Labs: All pertinent labs within the past 24 hours have been reviewed.    Significant Imaging: I have reviewed and interpreted all pertinent imaging results/findings within the past 24 hours.    Assessment/Plan:      * Acute pyelonephritis  Acute pyelonephritis and retained left renal  stone without hydronephrosis.  His white count was normal.  Blood and urine cultures obtained in the ED and then subsequently patient was given Zosyn and vancomycin which will be continued.  He does not meet sepsis criteria.Klebsiella in his urine 1 month prior which is pansensitive except for nitrofurantoin  Blood in urine cultures have been obtained.  There is inflammation around the left kidney and CT scan.    Yet  An 11 mm stone remains in the left renal pelvis without hydronephrosis.      Kidney stone on left side  Chronic and not causing hydronephrosis.  Urology has been consulted.  Ureteral Stents are in place.--in the left ureter.  Discussed with Urology and plan is to remove with laser or lithotripsy once infection is cleared.  Will continue to monitor and follow up urine culture.      Methamphetamine use  By history.  Denies current  usage.  May be having mild symptoms of withdrawal and will treat symptomatically.      Ureteral stone  See above.      Nicotine dependence  Patient was groggy from pain medication.  Will be counseled and advised to discontinue--benefits to be discussed.  Nicotine patch has been placed.      Hyponatremia  Acute in likely due to volume depletion.  This is mild and will be monitored with daily labs.  He is currently on normal saline -will continue and monitor renal function         VTE Risk Mitigation (From admission, onward)        Ordered     IP VTE LOW RISK PATIENT  Once      04/23/19 2044     Place UVALDO hose  Until discontinued      04/23/19 1442              Zohra Ridley MD  Department of Hospital Medicine   [facility]

## 2019-04-24 NOTE — ASSESSMENT & PLAN NOTE
Acute in likely due to volume depletion.  This is mild and will be monitored with daily labs.  He is currently on normal saline

## 2019-04-24 NOTE — ASSESSMENT & PLAN NOTE
Acute pyelonephritis and retained left renal  stone without hydronephrosis.  His white count was normal.  Blood and urine cultures obtained in the ED and then subsequently patient was given Zosyn and vancomycin which will be continued.  He does not meet sepsis criteria.Klebsiella in his urine 1 month prior which is pansensitive except for nitrofurantoin  Blood in urine cultures have been obtained.  There is inflammation around the left kidney and CT scan.    Yet  An 11 mm stone remains in the left renal pelvis without hydronephrosis.

## 2019-04-24 NOTE — ASSESSMENT & PLAN NOTE
Acute pyelonephritis and retained left ureteral stone without hydronephrosis.  His white count was normal.  Blood and urine cultures obtained in the ED and then subsequently patient was given Zosyn and vancomycin which will be continued.  He does not meet sepsis criteria.  He had close the Klebsiella in his urine 1 month prior which is pansensitive except for nitrofurantoin  Blood in urine cultures have been obtained.  There is inflammation around the left kidney and CT scan.  An 11 mm stone remains in the left renal pelvis without hydronephrosis.

## 2019-04-24 NOTE — NURSING
Situation-   Who are you? Who is the patient? What is going on with the patient currently?          I am Lauren E Abercrombie calling from [facility] in regards to Jacky Ray who is a 44 y.o. year old male admitted with Acute pyelonephritis who...   Background- What is the patient's significant medical history (CAD, ESRD, HIV positive, history of recent surgery/chemo.transfusion) and recent labs Has a past medical history of   Past Medical History:   Diagnosis Date    Renal disorder     KIDNEY STONES   . Most recent vitals include  Temp:  [97.7 °F (36.5 °C)-100.2 °F (37.9 °C)]   Pulse:  []   Resp:  [14-20]   BP: (109-120)/(63-98)   SpO2:  [94 %-99 %]  and labs          Assessment-   Has the following issues (abnormal labs, abnormal vitals, change in status, uncontrolled symptoms, patient request) Who is having complaining of burning/pain in his bladder. States last time he was here we gave him something to help.   Recommendation- What is the change in the plan of care requested? I was thinking maybe some Pyridium might help?   Plan-  What did we decide to do? New orders placed.

## 2019-04-25 ENCOUNTER — ANESTHESIA EVENT (OUTPATIENT)
Dept: SURGERY | Facility: HOSPITAL | Age: 44
DRG: 660 | End: 2019-04-25

## 2019-04-25 ENCOUNTER — ANESTHESIA (OUTPATIENT)
Dept: SURGERY | Facility: HOSPITAL | Age: 44
DRG: 660 | End: 2019-04-25

## 2019-04-25 LAB
ANION GAP SERPL CALC-SCNC: 8 MMOL/L (ref 8–16)
BACTERIA #/AREA URNS HPF: ABNORMAL /HPF
BASOPHILS # BLD AUTO: 0.1 K/UL (ref 0–0.2)
BASOPHILS NFR BLD: 0.9 % (ref 0–1.9)
BILIRUB UR QL STRIP: NEGATIVE
BUN SERPL-MCNC: 8 MG/DL (ref 6–20)
CALCIUM SERPL-MCNC: 9.4 MG/DL (ref 8.7–10.5)
CHLORIDE SERPL-SCNC: 104 MMOL/L (ref 95–110)
CLARITY UR: ABNORMAL
CO2 SERPL-SCNC: 27 MMOL/L (ref 23–29)
COLOR UR: YELLOW
CREAT SERPL-MCNC: 1 MG/DL (ref 0.5–1.4)
DIFFERENTIAL METHOD: ABNORMAL
EOSINOPHIL # BLD AUTO: 0.3 K/UL (ref 0–0.5)
EOSINOPHIL NFR BLD: 4.2 % (ref 0–8)
ERYTHROCYTE [DISTWIDTH] IN BLOOD BY AUTOMATED COUNT: 13.6 % (ref 11.5–14.5)
EST. GFR  (AFRICAN AMERICAN): >60 ML/MIN/1.73 M^2
EST. GFR  (NON AFRICAN AMERICAN): >60 ML/MIN/1.73 M^2
GLUCOSE SERPL-MCNC: 98 MG/DL (ref 70–110)
GLUCOSE UR QL STRIP: NEGATIVE
GRAM STN SPEC: NORMAL
GRAM STN SPEC: NORMAL
HCT VFR BLD AUTO: 40.3 % (ref 40–54)
HGB BLD-MCNC: 13.3 G/DL (ref 14–18)
HGB UR QL STRIP: ABNORMAL
KETONES UR QL STRIP: ABNORMAL
LEUKOCYTE ESTERASE UR QL STRIP: ABNORMAL
LYMPHOCYTES # BLD AUTO: 1.9 K/UL (ref 1–4.8)
LYMPHOCYTES NFR BLD: 24.4 % (ref 18–48)
MCH RBC QN AUTO: 29.8 PG (ref 27–31)
MCHC RBC AUTO-ENTMCNC: 32.9 G/DL (ref 32–36)
MCV RBC AUTO: 91 FL (ref 82–98)
MICROSCOPIC COMMENT: ABNORMAL
MONOCYTES # BLD AUTO: 0.9 K/UL (ref 0.3–1)
MONOCYTES NFR BLD: 12.3 % (ref 4–15)
NEUTROPHILS # BLD AUTO: 4.5 K/UL (ref 1.8–7.7)
NEUTROPHILS NFR BLD: 58.2 % (ref 38–73)
NITRITE UR QL STRIP: NEGATIVE
PH UR STRIP: 6 [PH] (ref 5–8)
PLATELET # BLD AUTO: 292 K/UL (ref 150–350)
PMV BLD AUTO: 6.8 FL (ref 9.2–12.9)
POTASSIUM SERPL-SCNC: 4.5 MMOL/L (ref 3.5–5.1)
PROT UR QL STRIP: NEGATIVE
RBC # BLD AUTO: 4.45 M/UL (ref 4.6–6.2)
RBC #/AREA URNS HPF: >100 /HPF (ref 0–4)
SODIUM SERPL-SCNC: 139 MMOL/L (ref 136–145)
SP GR UR STRIP: 1.02 (ref 1–1.03)
URN SPEC COLLECT METH UR: ABNORMAL
UROBILINOGEN UR STRIP-ACNC: ABNORMAL EU/DL
VANCOMYCIN TROUGH SERPL-MCNC: 8.1 UG/ML (ref 10–22)
WBC # BLD AUTO: 7.7 K/UL (ref 3.9–12.7)
WBC #/AREA URNS HPF: 25 /HPF (ref 0–5)

## 2019-04-25 PROCEDURE — C1769 GUIDE WIRE: HCPCS | Performed by: UROLOGY

## 2019-04-25 PROCEDURE — 25000003 PHARM REV CODE 250: Performed by: HOSPITALIST

## 2019-04-25 PROCEDURE — 74420 UROGRAPHY RTRGR +-KUB: CPT | Mod: 26,,, | Performed by: UROLOGY

## 2019-04-25 PROCEDURE — 37000008 HC ANESTHESIA 1ST 15 MINUTES: Performed by: UROLOGY

## 2019-04-25 PROCEDURE — S4991 NICOTINE PATCH NONLEGEND: HCPCS | Performed by: HOSPITALIST

## 2019-04-25 PROCEDURE — 25500020 PHARM REV CODE 255: Performed by: UROLOGY

## 2019-04-25 PROCEDURE — 99900103 DSU ONLY-NO CHARGE-INITIAL HR (STAT): Performed by: UROLOGY

## 2019-04-25 PROCEDURE — S4991 NICOTINE PATCH NONLEGEND: HCPCS | Performed by: UROLOGY

## 2019-04-25 PROCEDURE — C1894 INTRO/SHEATH, NON-LASER: HCPCS | Performed by: UROLOGY

## 2019-04-25 PROCEDURE — 25000003 PHARM REV CODE 250: Performed by: UROLOGY

## 2019-04-25 PROCEDURE — 99233 SBSQ HOSP IP/OBS HIGH 50: CPT | Mod: 25,,, | Performed by: UROLOGY

## 2019-04-25 PROCEDURE — 87086 URINE CULTURE/COLONY COUNT: CPT

## 2019-04-25 PROCEDURE — 36000707: Performed by: UROLOGY

## 2019-04-25 PROCEDURE — 63600175 PHARM REV CODE 636 W HCPCS: Performed by: HOSPITALIST

## 2019-04-25 PROCEDURE — 81000 URINALYSIS NONAUTO W/SCOPE: CPT

## 2019-04-25 PROCEDURE — D9220A PRA ANESTHESIA: Mod: ,,, | Performed by: ANESTHESIOLOGY

## 2019-04-25 PROCEDURE — 12000002 HC ACUTE/MED SURGE SEMI-PRIVATE ROOM

## 2019-04-25 PROCEDURE — 85025 COMPLETE CBC W/AUTO DIFF WBC: CPT

## 2019-04-25 PROCEDURE — 37000009 HC ANESTHESIA EA ADD 15 MINS: Performed by: UROLOGY

## 2019-04-25 PROCEDURE — 99233 PR SUBSEQUENT HOSPITAL CARE,LEVL III: ICD-10-PCS | Mod: 25,,, | Performed by: UROLOGY

## 2019-04-25 PROCEDURE — 80048 BASIC METABOLIC PNL TOTAL CA: CPT

## 2019-04-25 PROCEDURE — 63600175 PHARM REV CODE 636 W HCPCS: Performed by: ANESTHESIOLOGY

## 2019-04-25 PROCEDURE — 76000 PR  FLUOROSCOPE EXAMINATION: ICD-10-PCS | Mod: 26,59,, | Performed by: UROLOGY

## 2019-04-25 PROCEDURE — 25000003 PHARM REV CODE 250: Performed by: NURSE ANESTHETIST, CERTIFIED REGISTERED

## 2019-04-25 PROCEDURE — 52356 CYSTO/URETERO W/LITHOTRIPSY: CPT | Mod: LT,,, | Performed by: UROLOGY

## 2019-04-25 PROCEDURE — 87205 SMEAR GRAM STAIN: CPT

## 2019-04-25 PROCEDURE — 74420 PR  X-RAY RETROGRADE PYELOGRAM: ICD-10-PCS | Mod: 26,,, | Performed by: UROLOGY

## 2019-04-25 PROCEDURE — 71000039 HC RECOVERY, EACH ADD'L HOUR: Performed by: UROLOGY

## 2019-04-25 PROCEDURE — D9220A PRA ANESTHESIA: ICD-10-PCS | Mod: ,,, | Performed by: ANESTHESIOLOGY

## 2019-04-25 PROCEDURE — 71000033 HC RECOVERY, INTIAL HOUR: Performed by: UROLOGY

## 2019-04-25 PROCEDURE — 99900104 DSU ONLY-NO CHARGE-EA ADD'L HR (STAT): Performed by: UROLOGY

## 2019-04-25 PROCEDURE — 52356 PR CYSTO/URETERO W/LITHOTRIPSY: ICD-10-PCS | Mod: LT,,, | Performed by: UROLOGY

## 2019-04-25 PROCEDURE — 63600175 PHARM REV CODE 636 W HCPCS: Performed by: NURSE ANESTHETIST, CERTIFIED REGISTERED

## 2019-04-25 PROCEDURE — 76000 FLUOROSCOPY <1 HR PHYS/QHP: CPT | Mod: 26,59,, | Performed by: UROLOGY

## 2019-04-25 PROCEDURE — 27201423 OPTIME MED/SURG SUP & DEVICES STERILE SUPPLY: Performed by: UROLOGY

## 2019-04-25 PROCEDURE — 82365 CALCULUS SPECTROSCOPY: CPT

## 2019-04-25 PROCEDURE — 36000706: Performed by: UROLOGY

## 2019-04-25 PROCEDURE — 36415 COLL VENOUS BLD VENIPUNCTURE: CPT

## 2019-04-25 PROCEDURE — 80202 ASSAY OF VANCOMYCIN: CPT

## 2019-04-25 PROCEDURE — C2617 STENT, NON-COR, TEM W/O DEL: HCPCS | Performed by: UROLOGY

## 2019-04-25 DEVICE — STENT SET URETERAL 6X24CM: Type: IMPLANTABLE DEVICE | Site: URETER | Status: FUNCTIONAL

## 2019-04-25 RX ORDER — TAMSULOSIN HYDROCHLORIDE 0.4 MG/1
0.4 CAPSULE ORAL NIGHTLY
Status: DISCONTINUED | OUTPATIENT
Start: 2019-04-25 | End: 2019-04-26 | Stop reason: HOSPADM

## 2019-04-25 RX ORDER — FENTANYL CITRATE 50 UG/ML
INJECTION, SOLUTION INTRAMUSCULAR; INTRAVENOUS
Status: DISCONTINUED | OUTPATIENT
Start: 2019-04-25 | End: 2019-04-25

## 2019-04-25 RX ORDER — MIDAZOLAM HYDROCHLORIDE 1 MG/ML
INJECTION INTRAMUSCULAR; INTRAVENOUS
Status: DISCONTINUED | OUTPATIENT
Start: 2019-04-25 | End: 2019-04-25

## 2019-04-25 RX ORDER — KETOROLAC TROMETHAMINE 30 MG/ML
15 INJECTION, SOLUTION INTRAMUSCULAR; INTRAVENOUS EVERY 6 HOURS PRN
Status: DISCONTINUED | OUTPATIENT
Start: 2019-04-25 | End: 2019-04-26 | Stop reason: HOSPADM

## 2019-04-25 RX ORDER — LIDOCAINE HCL/PF 100 MG/5ML
SYRINGE (ML) INTRAVENOUS
Status: DISCONTINUED | OUTPATIENT
Start: 2019-04-25 | End: 2019-04-25

## 2019-04-25 RX ORDER — HYDROMORPHONE HYDROCHLORIDE 2 MG/ML
0.2 INJECTION, SOLUTION INTRAMUSCULAR; INTRAVENOUS; SUBCUTANEOUS EVERY 5 MIN PRN
Status: DISCONTINUED | OUTPATIENT
Start: 2019-04-25 | End: 2019-04-25 | Stop reason: HOSPADM

## 2019-04-25 RX ORDER — HYDROMORPHONE HYDROCHLORIDE 2 MG/ML
0.2 INJECTION, SOLUTION INTRAMUSCULAR; INTRAVENOUS; SUBCUTANEOUS EVERY 5 MIN PRN
Status: DISCONTINUED | OUTPATIENT
Start: 2019-04-25 | End: 2019-04-25

## 2019-04-25 RX ORDER — DEXAMETHASONE SODIUM PHOSPHATE 4 MG/ML
INJECTION, SOLUTION INTRA-ARTICULAR; INTRALESIONAL; INTRAMUSCULAR; INTRAVENOUS; SOFT TISSUE
Status: DISCONTINUED | OUTPATIENT
Start: 2019-04-25 | End: 2019-04-25

## 2019-04-25 RX ORDER — VANCOMYCIN HCL IN 5 % DEXTROSE 1G/250ML
1000 PLASTIC BAG, INJECTION (ML) INTRAVENOUS
Status: DISCONTINUED | OUTPATIENT
Start: 2019-04-25 | End: 2019-04-25

## 2019-04-25 RX ORDER — SODIUM CHLORIDE, SODIUM LACTATE, POTASSIUM CHLORIDE, CALCIUM CHLORIDE 600; 310; 30; 20 MG/100ML; MG/100ML; MG/100ML; MG/100ML
INJECTION, SOLUTION INTRAVENOUS CONTINUOUS PRN
Status: DISCONTINUED | OUTPATIENT
Start: 2019-04-25 | End: 2019-04-25

## 2019-04-25 RX ORDER — METOCLOPRAMIDE HYDROCHLORIDE 5 MG/ML
10 INJECTION INTRAMUSCULAR; INTRAVENOUS EVERY 10 MIN PRN
Status: DISCONTINUED | OUTPATIENT
Start: 2019-04-25 | End: 2019-04-25

## 2019-04-25 RX ORDER — PHENAZOPYRIDINE HYDROCHLORIDE 200 MG/1
200 TABLET, FILM COATED ORAL ONCE AS NEEDED
Status: COMPLETED | OUTPATIENT
Start: 2019-04-25 | End: 2019-04-25

## 2019-04-25 RX ORDER — PROPOFOL 10 MG/ML
VIAL (ML) INTRAVENOUS
Status: DISCONTINUED | OUTPATIENT
Start: 2019-04-25 | End: 2019-04-25

## 2019-04-25 RX ADMIN — DOCUSATE SODIUM AND SENNOSIDES 1 TABLET: 8.6; 5 TABLET, FILM COATED ORAL at 08:04

## 2019-04-25 RX ADMIN — NICOTINE 1 PATCH: 14 PATCH, EXTENDED RELEASE TRANSDERMAL at 08:04

## 2019-04-25 RX ADMIN — FENTANYL CITRATE 50 MCG: 50 INJECTION, SOLUTION INTRAMUSCULAR; INTRAVENOUS at 04:04

## 2019-04-25 RX ADMIN — MIDAZOLAM HYDROCHLORIDE 2 MG: 1 INJECTION, SOLUTION INTRAMUSCULAR; INTRAVENOUS at 03:04

## 2019-04-25 RX ADMIN — HYDROMORPHONE HYDROCHLORIDE 0.2 MG: 2 INJECTION INTRAMUSCULAR; INTRAVENOUS; SUBCUTANEOUS at 05:04

## 2019-04-25 RX ADMIN — HYDROMORPHONE HYDROCHLORIDE 1 MG: 2 INJECTION, SOLUTION INTRAMUSCULAR; INTRAVENOUS; SUBCUTANEOUS at 09:04

## 2019-04-25 RX ADMIN — TAMSULOSIN HYDROCHLORIDE 0.4 MG: 0.4 CAPSULE ORAL at 08:04

## 2019-04-25 RX ADMIN — VANCOMYCIN HYDROCHLORIDE 750 MG: 1 INJECTION, POWDER, FOR SOLUTION INTRAVENOUS at 12:04

## 2019-04-25 RX ADMIN — ACETAMINOPHEN 650 MG: 325 TABLET ORAL at 11:04

## 2019-04-25 RX ADMIN — HYDROMORPHONE HYDROCHLORIDE 1 MG: 2 INJECTION, SOLUTION INTRAMUSCULAR; INTRAVENOUS; SUBCUTANEOUS at 01:04

## 2019-04-25 RX ADMIN — LIDOCAINE HYDROCHLORIDE 100 MG: 20 INJECTION, SOLUTION INTRAVENOUS at 03:04

## 2019-04-25 RX ADMIN — NICOTINE 1 PATCH: 14 PATCH, EXTENDED RELEASE TRANSDERMAL at 09:04

## 2019-04-25 RX ADMIN — PIPERACILLIN SODIUM AND TAZOBACTAM SODIUM 4.5 G: 4; .5 INJECTION, POWDER, LYOPHILIZED, FOR SOLUTION INTRAVENOUS at 08:04

## 2019-04-25 RX ADMIN — SODIUM CHLORIDE, SODIUM LACTATE, POTASSIUM CHLORIDE, AND CALCIUM CHLORIDE: .6; .31; .03; .02 INJECTION, SOLUTION INTRAVENOUS at 03:04

## 2019-04-25 RX ADMIN — KETOROLAC TROMETHAMINE 15 MG: 30 INJECTION, SOLUTION INTRAMUSCULAR at 07:04

## 2019-04-25 RX ADMIN — HYDROMORPHONE HYDROCHLORIDE 1 MG: 2 INJECTION, SOLUTION INTRAMUSCULAR; INTRAVENOUS; SUBCUTANEOUS at 05:04

## 2019-04-25 RX ADMIN — PROPOFOL 200 MG: 10 INJECTION, EMULSION INTRAVENOUS at 03:04

## 2019-04-25 RX ADMIN — PHENAZOPYRIDINE HYDROCHLORIDE 200 MG: 200 TABLET ORAL at 05:04

## 2019-04-25 RX ADMIN — VANCOMYCIN HYDROCHLORIDE 1000 MG: 1 INJECTION, POWDER, FOR SOLUTION INTRAVENOUS at 02:04

## 2019-04-25 RX ADMIN — FENTANYL CITRATE 50 MCG: 50 INJECTION, SOLUTION INTRAMUSCULAR; INTRAVENOUS at 03:04

## 2019-04-25 RX ADMIN — SODIUM CHLORIDE: 0.9 INJECTION, SOLUTION INTRAVENOUS at 04:04

## 2019-04-25 RX ADMIN — PIPERACILLIN SODIUM AND TAZOBACTAM SODIUM 4.5 G: 4; .5 INJECTION, POWDER, LYOPHILIZED, FOR SOLUTION INTRAVENOUS at 04:04

## 2019-04-25 RX ADMIN — DEXAMETHASONE SODIUM PHOSPHATE 4 MG: 4 INJECTION, SOLUTION INTRAMUSCULAR; INTRAVENOUS at 03:04

## 2019-04-25 RX ADMIN — ONDANSETRON 4 MG: 2 INJECTION, SOLUTION INTRAMUSCULAR; INTRAVENOUS at 03:04

## 2019-04-25 RX ADMIN — PIPERACILLIN SODIUM AND TAZOBACTAM SODIUM 4.5 G: 4; .5 INJECTION, POWDER, LYOPHILIZED, FOR SOLUTION INTRAVENOUS at 12:04

## 2019-04-25 NOTE — CONSULTS
Jacky Ray [id number] is a 44 y.o. male who has been consulted for vancomycin dosing.    The patient has the following labs:     Date Creatinine (mg/dl)    BUN WBC Count   4/25/2019 Estimated Creatinine Clearance: 78.1 mL/min (based on SCr of 1 mg/dL). Lab Results   Component Value Date    BUN 8 04/25/2019     Lab Results   Component Value Date    WBC 7.70 04/25/2019        Current weight is 66 kg (145 lb 8.1 oz)    Pt is receiving vancomycin 1000 mg every 12 hours.  Vancomycin trough from 04/25 at 1105 was 8.1 mg/dL.  Target trough range is 10-15 mg/dL.   Trough was drawn about 30 minutes early and anticipate it is sub/therapeutic.  Pharmacy will increase current doses to a vancomycin dose of 1000 mg every 12 hours. A vancomycin trough has been ordered prior to 4th dose due 04/27 at 0230.      Patient will be followed by pharmacy for changes in renal function, toxicity, and efficacy.  Thank you for allowing us to participate in this patient's care.     Leonel Degroot, SamanthaD

## 2019-04-25 NOTE — TRANSFER OF CARE
"Anesthesia Transfer of Care Note    Patient: Jacky Ray    Procedure(s) Performed: Procedure(s) (LRB):  REMOVAL, CALCULUS, URETER (Left)  CYSTOURETEROSCOPY, WITH RETROGRADE PYELOGRAM AND URETERAL STENT INSERTION (Left)  LITHOTRIPSY, USING LASER (Left)  REMOVAL, STENT, URETER (Left)    Patient location: PACU    Anesthesia Type: general    Transport from OR: Transported from OR on 2-3 L/min O2 by NC with adequate spontaneous ventilation    Post pain: adequate analgesia    Post assessment: no apparent anesthetic complications and tolerated procedure well    Post vital signs: stable    Level of consciousness: awake    Nausea/Vomiting: no nausea/vomiting    Complications: none    Transfer of care protocol was followed      Last vitals:   Visit Vitals  BP (!) 105/55   Pulse 62   Temp 36.6 °C (97.9 °F)   Resp 18   Ht 5' 4" (1.626 m)   Wt 66 kg (145 lb 8.1 oz)   SpO2 98%   BMI 24.98 kg/m²     "

## 2019-04-25 NOTE — PROGRESS NOTES
"[facility] Hospital Medicine  Progress Note    Patient Name: Jacky Ray  MRN: [id number]  Patient Class: IP- Inpatient   Admission Date: 4/23/2019  Length of Stay: 2 days  Attending Physician: Zohra Ridley MD  Primary Care Provider: Primary Doctor No        Subjective:     Principal Problem:Acute pyelonephritis    HPI:    Jacky Ray is a 44 y.o. male with renal disorder who presents to the ED with an onset of lower abdominal pain with associated fever and chills. He reports having a kidney stone and states he feels as if he has "sepsis." The patient was seen at [facility] in [place], [place], admitted for sepsis, transferred to [facility] for a cystoscopy with stent placement performed by Dr. Hernando Hernandez ~3.5 weeks ago on 3/28 but left against medical advice.  The patient is a daily cigarette smoker. He denies any other symptoms at this time. No additional pertinent PSHx noted. Tetanus vaccination and toxoid drug allergy noted    Patient is somewhat groggy after pain medication when I saw him and reports he has had left flank pain for 2 weeks but did not seek medical attention.  He reports that the fever was which concerned him to come in today.  He has had no nausea vomiting or diarrhea.  Has had severe dysuria.  Denies hematuria.          Hospital Course:  Jacky Ray is a 44 y.o. male with hx kidney stone s/p stent one month ago and pyelo who presents to the ED with an onset of lower abdominal pain with associated fever and chills. --noted to have pyelo, retained stent and 11 mm stone left renal pelvis. He did not follow up after last admit.  Hydronephrosis was resolved. Blood/urine cultures obtain, initiated on vanc/zosyn and urology was consulted.   4/25-stone lasered and removed and stent replaced with stent on string so he can remove himself. Urine positive for klebsiella   And final sensitive pends ; Urology obtained repeat culture during " procedure-once final culture can dc home with  Po abx x 10 more days . Cm consulted to arrange fu-has no insurance. -? Medicaid application.      Interval History:   Pt seen/examined-post op is alert but confused  Stone removed and stent on string has been placed.       Review of Systems   Unable to perform ROS: Other   post op   Objective:     Vital Signs (Most Recent):  Temp: 98 °F (36.7 °C) (04/25/19 1702)  Pulse: 62 (04/25/19 1430)  Resp: 18 (04/25/19 1430)  BP: (!) 105/55 (04/25/19 1500)  SpO2: 98 % (04/25/19 1430) Vital Signs (24h Range):  Temp:  [96.9 °F (36.1 °C)-98.2 °F (36.8 °C)] 98 °F (36.7 °C)  Pulse:  [58-70] 62  Resp:  [18-20] 18  SpO2:  [96 %-100 %] 98 %  BP: (103-115)/(55-69) 105/55     Weight: 66 kg (145 lb 8.1 oz)  Body mass index is 24.98 kg/m².    Intake/Output Summary (Last 24 hours) at 4/25/2019 1725  Last data filed at 4/25/2019 1653  Gross per 24 hour   Intake 4522.5 ml   Output 1550 ml   Net 2972.5 ml      Physical Exam   Constitutional: He is oriented to person, place, and time. He appears well-developed and well-nourished. No distress.   Comfortable    HENT:   Head: Normocephalic and atraumatic.   Right Ear: External ear normal.   Left Ear: External ear normal.   Nose: Nose normal.   Mouth/Throat: Oropharynx is clear and moist. No oropharyngeal exudate.   Eyes: Conjunctivae are normal. Right eye exhibits no discharge. Left eye exhibits no discharge. No scleral icterus.   Neck: No thyromegaly present.   Cardiovascular: Normal rate, regular rhythm, normal heart sounds and intact distal pulses. Exam reveals no gallop and no friction rub.   No murmur heard.  Pulmonary/Chest: Effort normal and breath sounds normal. No respiratory distress. He has no wheezes.   Abdominal: Soft. Bowel sounds are normal. He exhibits no distension and no mass. There is tenderness in the left lower quadrant. There is CVA tenderness. There is no rebound and no guarding.   Genitourinary:   Genitourinary Comments:  Diaz in place with dark urine.   Musculoskeletal: He exhibits no edema or tenderness.   Lymphadenopathy:     He has no cervical adenopathy.   Neurological: He is alert and oriented to person, place, and time. No cranial nerve deficit. Coordination normal.   Skin: Skin is warm and dry. No rash noted. No erythema.   Psychiatric: His speech is normal. Thought content normal. His mood appears anxious. He is agitated. Cognition and memory are normal.   Nursing note and vitals reviewed.      Significant Labs: All pertinent labs within the past 24 hours have been reviewed.    Significant Imaging: I have reviewed and interpreted all pertinent imaging results/findings within the past 24 hours.    Assessment/Plan:      * Acute pyelonephritis  Acute pyelonephritis and retained left renal  stone without hydronephrosis.  Urine culture -klebsiella -same as last admit 1 month ago -on zosyn. Dc vanc  His white count was normal.  Blood and urine cultures obtained in the ED and then subsequently patient was given Zosyn and vancomycin which will be continued.  He does not meet sepsis criteria.Klebsiella in his urine 1 month prior which is pansensitive except for nitrofurantoin  Blood in urine cultures have been obtained.  There is inflammation around the left kidney and CT scan.     An 11 mm stone remains in the left renal pelvis without hydronephrosis.      Kidney stone on left side  Removed 4/25  Chronic and not causing hydronephrosis.  Urology has been consulted.  Ureteral Stents are in place.--in the left ureter.  Discussed with Urology and plan is to remove with laser or lithotripsy once infection is cleared.  Will continue to monitor and follow up urine culture.      Methamphetamine use  By history.  Denies current usage.  May be having mild symptoms of withdrawal and will treat symptomatically.      Ureteral stone  See above.      Nicotine dependence  Patient was groggy from pain medication.  Will be counseled and advised to  discontinue--benefits to be discussed.  Nicotine patch has been placed.      Hyponatremia  Resolved   Acute in likely due to volume depletion.  This is mild and will be monitored with daily labs.  He is currently on normal saline -will continue and monitor renal function         VTE Risk Mitigation (From admission, onward)        Ordered     IP VTE LOW RISK PATIENT  Once      04/23/19 2044     Place UVALDO hose  Until discontinued      04/23/19 1442        dispo--home am if afebrile, tolerating po after urine culture results obtained.       Zohra Ridley MD  Department of Hospital Medicine   [facility]

## 2019-04-25 NOTE — HOSPITAL COURSE
Jacky Ray is a 44 y.o. male with hx kidney stone s/p stent one month ago and pyelo who presents to the ED with an onset of lower abdominal pain with associated fever and chills. --noted to have pyelo, retained stent and 11 mm stone left renal pelvis. He did not follow up after last admit.  Hydronephrosis was resolved. Blood/urine cultures obtained, initiated on vanc/zosyn and urology was consulted.   4/25-stone lasered and removed and stent replaced with stent on string so he can remove himself. Urine positive for klebsiella which is sensitive to Cipro.    The patient is doing much better will be discharged home today with a prescription for Cipro.  He has been instructed to pull the stent out on Monday.  We are working on getting him a urology  Appointment in [place].

## 2019-04-25 NOTE — ANESTHESIA POSTPROCEDURE EVALUATION
Anesthesia Post Evaluation    Patient: Jacky Ray    Procedure(s) Performed: Procedure(s) (LRB):  REMOVAL, CALCULUS, URETER (Left)  CYSTOURETEROSCOPY, WITH RETROGRADE PYELOGRAM AND URETERAL STENT INSERTION (Left)  LITHOTRIPSY, USING LASER (Left)  REMOVAL, STENT, URETER (Left)    Final Anesthesia Type: general  Patient location during evaluation: PACU  Patient participation: Yes- Able to Participate  Level of consciousness: awake and alert  Post-procedure vital signs: reviewed and stable  Pain management: adequate  Airway patency: patent  PONV status at discharge: No PONV  Anesthetic complications: no      Cardiovascular status: blood pressure returned to baseline  Respiratory status: unassisted  Hydration status: euvolemic  Follow-up not needed.          Vitals Value Taken Time   /55 4/25/2019  3:00 PM   Temp 36.6 °C (97.9 °F) 4/25/2019  2:30 PM   Pulse 62 4/25/2019  2:30 PM   Resp 18 4/25/2019  2:30 PM   SpO2 98 % 4/25/2019  2:30 PM         No case tracking events are documented in the log.      Pain/Babatunde Score: Pain Rating Prior to Med Admin: 4 (4/25/2019  2:30 PM)  Pain Rating Post Med Admin: 4 (below at bladder ) (4/25/2019  2:30 PM)

## 2019-04-25 NOTE — ASSESSMENT & PLAN NOTE
Removed 4/25  Chronic and not causing hydronephrosis.  Urology has been consulted.  Ureteral Stents are in place.--in the left ureter.  Discussed with Urology and plan is to remove with laser or lithotripsy once infection is cleared.  Will continue to monitor and follow up urine culture.

## 2019-04-25 NOTE — PLAN OF CARE
Problem: Adult Inpatient Plan of Care  Goal: Plan of Care Review  AOX4  IV site wnl. Cooperative with not being able to go smoke. Applied nicotine patch.Medicated with morphine IV. Diaz patent draining lt. Yellow clear urine.Denies cough, congestion, s.o.b.Ambulatory independently . Medication works . Pt snoring after receiving it. Slept in between care. Abd. Soft. Bowel sounds wnl. Explained strict Intake and output. Stated understanding. NPO in progress.

## 2019-04-25 NOTE — PLAN OF CARE
Informed by family member that patient was back from downstairs from smoking. Spoke with patient about no smoking policy and that patient did not have a pass to go downstairs. Patient states did not know anything about a pass and was requesting a nicotine patch. According to MAR a nicotine patch was placed this am, but patient apparently showered and had removed it. A nicotine patch was reapplied to right arm.

## 2019-04-25 NOTE — BRIEF OP NOTE
[facility]  Brief Operative Note    Date: 04/25/2019    Staff Surgeon: Hernando Hernandez MD    Pre-Op Diagnosis:  Left renal calculus  History of urosepsis 2/2 renal/proximal ureteral calculus obstruction    Post-Op Diagnosis: same    Procedure(s) Performed:   Left ureteroscopy with holmium laser lithotripsy lithotripsy and basket stone extraction  Cystoscopy with exchange of left ureteral stent, 6x24cm now on a string  Left retrograde pyelogram  Flouroscopy < 1 hour  Interpretation of flouroscopic images    Specimen(s): stone for chemical analysis    Anesthesia: General LMA anesthesia    Findings: 1.2cm renal calculus, dusted to basektable fragment, removed    Estimated Blood Loss: minimal    Drains: ureteral stent as above    Complications: none    Disposition:   Return to floor fo continued IVF and IV abx and care as per hospital medicine  Ucx with lower colony count of Klebsiella  DC with po abx once sensitivities back tomorrow (or if meets dc criteria, can go by sensitivities from last admit)  Give 1 week flomax  Instruct to remove ureteral stent via urethral string on Monday morning 4/29/19 by 0900  Further follow up with [facility] (or if can get [place] Medicaid arranged, can f/u with [facility])  All recs discussed with Dr Ridley, [facility]

## 2019-04-25 NOTE — CONSULTS
Jacky Ray [id number] is a 44 y.o. male who had been consulted for vancomycin dosing.    Vancomycin has been discontinued.  Pharmacy consult for vancomycin dosing in no longer required.      Thank you for allowing us to participate in this patient's care.     Sonia Herbert
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
## 2019-04-25 NOTE — PLAN OF CARE
Cm completed the assessment at pt's bedside.  Pt is a readmit form 3/29/19.  Pt is self care.  No PCP or insurance. Pt denies diabetes, dialysis and coumadin.  Disposition:  Pt will discharge to home.  No needs verbalized at this time.       04/24/19 9639   Discharge Assessment   Assessment Type Discharge Planning Assessment   Confirmed/corrected address and phone number on facesheet? Yes   Assessment information obtained from? Patient   Prior to hospitilization cognitive status: Alert/Oriented   Prior to hospitalization functional status: Independent   Current cognitive status: Alert/Oriented   Current Functional Status: Independent   Facility Arrived From: home   Lives With friend(s)   Is patient able to care for self after discharge? Yes   Who are your caregiver(s) and their phone number(s)? Martha Vazquez - [phone]   Patient's perception of discharge disposition home or selfcare   Readmission Within the Last 30 Days previous discharge plan unsuccessful   If yes, most recent facility name: [facility]   Patient currently being followed by outpatient case management? No   Patient currently receives any other outside agency services? No   Equipment Currently Used at Home none   Do you have any problems affording any of your prescribed medications? Yes   If yes, what medications? no insurance.  Self employed   Is the patient taking medications as prescribed? yes   Does the patient have transportation home? Yes   Transportation Anticipated family or friend will provide   Dialysis Name and Scheduled days na   Does the patient receive services at the Coumadin Clinic? No   Discharge Plan A Home   Patient/Family in Agreement with Plan yes   Readmission Questionnaire   At the time of your discharge, did someone talk to you about what your health problems were? Yes   At the time of discharge, did someone talk to you about what to watch out for regarding worsening of your health problem? Yes   At the time of  discharge, did someone talk to you about what to do if you experienced worsening of your health problem? Yes   At the time of discharge, did someone talk to you about which medication to take when you left the hospital and which ones to stop taking? Yes   At the time of discharge, did someone talk to you about when and where to follow up with a doctor after you left the hospital? Yes   What do you believe caused you to be sick enough to be re-admitted? UTI   How often do you need to have someone help you when you read instructions, pamphlets, or other written material from your doctor or pharmacy? Never   Do you have problems taking your medications as prescribed? No   Do you have any problems affording any of  your prescribed medications? Yes  ([employer] needed)   Do you have problems obtaining/receiving your medications? No   Does the patient have transportation to healthcare appointments? Yes   Living Arrangements house   Does the patient have family/friends to help with healtcare needs after discharge? yes   Does your caregiver provide all the help you need? Yes   Are you currently feeling confused? No   Are you currently having problems thinking? No   Are you currently having memory problems? No   Have you felt down, depressed, or hopeless? 0   Have you felt little interest or pleasure in doing things? 0   In the last 7 days, my sleep quality was: good

## 2019-04-25 NOTE — PROGRESS NOTES
"[facility] Urology Progress Note    Jacky Ray is a 44 y.o. male left renal calculus, retained ureteral stent, klebsiella uti/urosepsis      Afebrile >24h  bcxs -x2d  ucx still "in process"  Has had no labs since admission - so stat labs ordered during this encounter  He reports his urine is "getting better"  He overall feels better          ROS: A comprehensive 10 system review was performed and is negative except as noted above in HPI    PHYSICAL EXAM:    Vitals:    04/25/19 2054   BP: 130/74   Pulse: 90   Resp: 20   Temp: 97.5 °F (36.4 °C)     Body mass index is 24.98 kg/m².       General: Alert, cooperative, diaphoretic, appears stated age   Head: Normocephalic, without obvious abnormality, atraumatic   Eyes: PERRL, conjunctiva/corneas clear   Lungs: Respirations unlabored   Heart: Warm and well perfused   Abdomen:soft NT ND  : clear yellow urine per chan  Extremities: Extremities normal, atraumatic, no cyanosis or edema   Skin: Skin color, texture, turgor normal, no rashes or lesions   Psych: Appropriate   Neurologic: Non-focal       Lactic acid, plasma #2    Collection Time: 04/23/19  2:38 PM   Result Value Ref Range    Lactate (Lactic Acid) 0.7 0.5 - 2.2 mmol/L   CBC auto differential    Collection Time: 04/25/19  7:00 AM   Result Value Ref Range    WBC 7.70 3.90 - 12.70 K/uL    RBC 4.45 (L) 4.60 - 6.20 M/uL    Hemoglobin 13.3 (L) 14.0 - 18.0 g/dL    Hematocrit 40.3 40.0 - 54.0 %    MCV 91 82 - 98 fL    MCH 29.8 27.0 - 31.0 pg    MCHC 32.9 32.0 - 36.0 g/dL    RDW 13.6 11.5 - 14.5 %    Platelets 292 150 - 350 K/uL    MPV 6.8 (L) 9.2 - 12.9 fL    Gran # (ANC) 4.5 1.8 - 7.7 K/uL    Lymph # 1.9 1.0 - 4.8 K/uL    Mono # 0.9 0.3 - 1.0 K/uL    Eos # 0.3 0.0 - 0.5 K/uL    Baso # 0.10 0.00 - 0.20 K/uL    Gran% 58.2 38.0 - 73.0 %    Lymph% 24.4 18.0 - 48.0 %    Mono% 12.3 4.0 - 15.0 %    Eosinophil% 4.2 0.0 - 8.0 %    Basophil% 0.9 0.0 - 1.9 %    Differential Method Automated    Basic metabolic panel    " Collection Time: 04/25/19  7:00 AM   Result Value Ref Range    Sodium 139 136 - 145 mmol/L    Potassium 4.5 3.5 - 5.1 mmol/L    Chloride 104 95 - 110 mmol/L    CO2 27 23 - 29 mmol/L    Glucose 98 70 - 110 mg/dL    BUN, Bld 8 6 - 20 mg/dL    Creatinine 1.0 0.5 - 1.4 mg/dL    Calcium 9.4 8.7 - 10.5 mg/dL    Anion Gap 8 8 - 16 mmol/L    eGFR if African American >60 >60 mL/min/1.73 m^2    eGFR if non African American >60 >60 mL/min/1.73 m^2   Urinalysis    Collection Time: 04/25/19  7:10 AM   Result Value Ref Range    Specimen UA Urine, Catheterized     Color, UA Yellow Yellow, Straw, Angelia    Appearance, UA Hazy (A) Clear    pH, UA 6.0 5.0 - 8.0    Specific Gravity, UA 1.020 1.005 - 1.030    Protein, UA Negative Negative    Glucose, UA Negative Negative    Ketones, UA 1+ (A) Negative    Bilirubin (UA) Negative Negative    Occult Blood UA 3+ (A) Negative    Nitrite, UA Negative Negative    Urobilinogen, UA 4.0-6.0 (A) <2.0 EU/dL    Leukocytes, UA 2+ (A) Negative   Urinalysis Microscopic    Collection Time: 04/25/19  7:10 AM   Result Value Ref Range    RBC, UA >100 (H) 0 - 4 /hpf    WBC, UA 25 (H) 0 - 5 /hpf    Bacteria, UA Few (A) None-Occ /hpf    Microscopic Comment SEE COMMENT    Gram stain    Collection Time: 04/25/19  7:53 AM   Result Value Ref Range    Gram Stain Result Rare WBC's     Gram Stain Result No organisms seen        ASSESSMENT   Left renal calculus with retained stent   UTI/urosepsis (likely klebsiella UTI persistent from last admit)    Plan    No leukocytosis  Afberile 48hrs with 48 hrs IV vanc/zosyn  UA nitrite negative now  Stat gram stain showed no organisms after 2d of abx  Discussed remaining NPO today and added on for L ureteroscopy/laser lithotripsy for definitive stone management with stent on string (duration indwelling 3-7d depending on intraop findings) so he can pull on own  All risks and benefits of procedure discussed in detail  To OR this afternoon for L URS/HLL

## 2019-04-25 NOTE — ASSESSMENT & PLAN NOTE
Resolved   Acute in likely due to volume depletion.  This is mild and will be monitored with daily labs.  He is currently on normal saline -will continue and monitor renal function

## 2019-04-25 NOTE — ANESTHESIA PREPROCEDURE EVALUATION
04/25/2019  Jacky Ray is a 44 y.o., male.    Pre-op Assessment    I have reviewed the Patient Summary Reports.     I have reviewed the Nursing Notes.   I have reviewed the Medications.     Review of Systems  Anesthesia Hx:  No problems with previous Anesthesia  Denies Family Hx of Anesthesia complications.   Denies Personal Hx of Anesthesia complications.   Social:  Smoker, Alcohol Use 1 ppd tobacco, frequent marijuana, fifth of liquor weekly   EENT/Dental:   Feels like all of his teeth are loose   Cardiovascular:  Cardiovascular Normal     Pulmonary:  Pulmonary Normal    Renal/:   Chronic Renal Disease renal calculi    Hepatic/GI:  Hepatic/GI Normal    Neurological:  Neurology Normal    Endocrine:  Endocrine Normal        Physical Exam  General:  Well nourished In acute distress    Airway/Jaw/Neck:  Airway Findings: Mouth Opening: Normal Tongue: Normal  General Airway Assessment: Adult, Average  Mallampati: II  Jaw/Neck Findings:  Neck ROM: Normal ROM       Chest/Lungs:  Chest/Lungs Findings: Clear to auscultation, Normal Respiratory Rate     Heart/Vascular:  Heart Findings: Rate: Normal  Rhythm: Regular Rhythm  Sounds: Normal  Heart murmur: negative       Mental Status:  Mental Status Findings:  Cooperative, Alert and Oriented         Anesthesia Plan  Type of Anesthesia, risks & benefits discussed:  Anesthesia Type:  general  Patient's Preference:   Intra-op Monitoring Plan: standard ASA monitors  Intra-op Monitoring Plan Comments:   Post Op Pain Control Plan:   Post Op Pain Control Plan Comments:   Induction:   IV  Beta Blocker:  Patient is not currently on a Beta-Blocker (No further documentation required).       Informed Consent: Patient understands risks and agrees with Anesthesia plan.  Questions answered. Anesthesia consent signed with patient.  ASA Score: 2  emergent   Day of Surgery  Review of History & Physical:    H&P update referred to the surgeon.         Ready For Surgery From Anesthesia Perspective.

## 2019-04-25 NOTE — SUBJECTIVE & OBJECTIVE
Interval History:   Pt seen/examined-post op is alert but confused  Stone removed and stent on string has been placed.       Review of Systems   Unable to perform ROS: Other   post op   Objective:     Vital Signs (Most Recent):  Temp: 98 °F (36.7 °C) (04/25/19 1702)  Pulse: 62 (04/25/19 1430)  Resp: 18 (04/25/19 1430)  BP: (!) 105/55 (04/25/19 1500)  SpO2: 98 % (04/25/19 1430) Vital Signs (24h Range):  Temp:  [96.9 °F (36.1 °C)-98.2 °F (36.8 °C)] 98 °F (36.7 °C)  Pulse:  [58-70] 62  Resp:  [18-20] 18  SpO2:  [96 %-100 %] 98 %  BP: (103-115)/(55-69) 105/55     Weight: 66 kg (145 lb 8.1 oz)  Body mass index is 24.98 kg/m².    Intake/Output Summary (Last 24 hours) at 4/25/2019 1725  Last data filed at 4/25/2019 1653  Gross per 24 hour   Intake 4522.5 ml   Output 1550 ml   Net 2972.5 ml      Physical Exam   Constitutional: He is oriented to person, place, and time. He appears well-developed and well-nourished. No distress.   Comfortable    HENT:   Head: Normocephalic and atraumatic.   Right Ear: External ear normal.   Left Ear: External ear normal.   Nose: Nose normal.   Mouth/Throat: Oropharynx is clear and moist. No oropharyngeal exudate.   Eyes: Conjunctivae are normal. Right eye exhibits no discharge. Left eye exhibits no discharge. No scleral icterus.   Neck: No thyromegaly present.   Cardiovascular: Normal rate, regular rhythm, normal heart sounds and intact distal pulses. Exam reveals no gallop and no friction rub.   No murmur heard.  Pulmonary/Chest: Effort normal and breath sounds normal. No respiratory distress. He has no wheezes.   Abdominal: Soft. Bowel sounds are normal. He exhibits no distension and no mass. There is tenderness in the left lower quadrant. There is CVA tenderness. There is no rebound and no guarding.   Genitourinary:   Genitourinary Comments: Diaz in place with dark urine.   Musculoskeletal: He exhibits no edema or tenderness.   Lymphadenopathy:     He has no cervical adenopathy.    Neurological: He is alert and oriented to person, place, and time. No cranial nerve deficit. Coordination normal.   Skin: Skin is warm and dry. No rash noted. No erythema.   Psychiatric: His speech is normal. Thought content normal. His mood appears anxious. He is agitated. Cognition and memory are normal.   Nursing note and vitals reviewed.      Significant Labs: All pertinent labs within the past 24 hours have been reviewed.    Significant Imaging: I have reviewed and interpreted all pertinent imaging results/findings within the past 24 hours.

## 2019-04-25 NOTE — ASSESSMENT & PLAN NOTE
Acute pyelonephritis and retained left renal  stone without hydronephrosis.  Urine culture -klebsiella -same as last admit 1 month ago -on zosyn. Dc vanc  His white count was normal.  Blood and urine cultures obtained in the ED and then subsequently patient was given Zosyn and vancomycin which will be continued.  He does not meet sepsis criteria.Klebsiella in his urine 1 month prior which is pansensitive except for nitrofurantoin  Blood in urine cultures have been obtained.  There is inflammation around the left kidney and CT scan.     An 11 mm stone remains in the left renal pelvis without hydronephrosis.

## 2019-04-25 NOTE — PLAN OF CARE
"Pt awake and oriented, vss, pt complaining of pain and burning to L testicle and feeling the urge to urinate, Dr. Hernandez spoke with pt and told him that he was going to have discomfort due to the stent on the L side, PRN pain med given and Pyridium given, pt stated he wanted to go back to his room and use the restroom because unable to use urinal while in bed, tolerated clear liquids without N/V, jessie SCDs on. Ok per Dr. Balderas to transfer the pt back to the floor. Pt transported via bed back to room 301 bed 2 . Pt voided in toilet upon arrival to room. After voiding pt stated "I feel so much better". Stent on a string covered with tegaderm on penis. Report given to AL Burrows by AL Ramon and AL Rico.   "

## 2019-04-26 VITALS
TEMPERATURE: 98 F | HEIGHT: 64 IN | DIASTOLIC BLOOD PRESSURE: 63 MMHG | RESPIRATION RATE: 18 BRPM | OXYGEN SATURATION: 97 % | BODY MASS INDEX: 24.84 KG/M2 | SYSTOLIC BLOOD PRESSURE: 115 MMHG | HEART RATE: 62 BPM | WEIGHT: 145.5 LBS

## 2019-04-26 LAB
BACTERIA UR CULT: NO GROWTH
BACTERIA UR CULT: NORMAL

## 2019-04-26 PROCEDURE — 63600175 PHARM REV CODE 636 W HCPCS: Performed by: UROLOGY

## 2019-04-26 PROCEDURE — 63600175 PHARM REV CODE 636 W HCPCS: Performed by: HOSPITALIST

## 2019-04-26 PROCEDURE — 25000003 PHARM REV CODE 250: Performed by: UROLOGY

## 2019-04-26 PROCEDURE — S4991 NICOTINE PATCH NONLEGEND: HCPCS | Performed by: UROLOGY

## 2019-04-26 RX ORDER — OXYCODONE AND ACETAMINOPHEN 5; 325 MG/1; MG/1
1 TABLET ORAL EVERY 6 HOURS PRN
Qty: 14 TABLET | Refills: 0 | Status: SHIPPED | OUTPATIENT
Start: 2019-04-26

## 2019-04-26 RX ORDER — CIPROFLOXACIN 500 MG/1
500 TABLET ORAL EVERY 12 HOURS
Qty: 24 TABLET | Refills: 0 | Status: SHIPPED | OUTPATIENT
Start: 2019-04-26 | End: 2019-05-08

## 2019-04-26 RX ORDER — MORPHINE SULFATE 8 MG/ML
4 INJECTION INTRAMUSCULAR; INTRAVENOUS; SUBCUTANEOUS ONCE
Status: COMPLETED | OUTPATIENT
Start: 2019-04-26 | End: 2019-04-26

## 2019-04-26 RX ORDER — TAMSULOSIN HYDROCHLORIDE 0.4 MG/1
0.4 CAPSULE ORAL NIGHTLY
Qty: 7 CAPSULE | Refills: 0 | Status: SHIPPED | OUTPATIENT
Start: 2019-04-26 | End: 2019-05-03

## 2019-04-26 RX ADMIN — SODIUM CHLORIDE: 0.9 INJECTION, SOLUTION INTRAVENOUS at 06:04

## 2019-04-26 RX ADMIN — MORPHINE SULFATE 4 MG: 8 INJECTION INTRAVENOUS at 01:04

## 2019-04-26 RX ADMIN — NICOTINE 1 PATCH: 14 PATCH, EXTENDED RELEASE TRANSDERMAL at 08:04

## 2019-04-26 RX ADMIN — PIPERACILLIN SODIUM AND TAZOBACTAM SODIUM 4.5 G: 4; .5 INJECTION, POWDER, LYOPHILIZED, FOR SOLUTION INTRAVENOUS at 12:04

## 2019-04-26 RX ADMIN — SODIUM CHLORIDE: 0.9 INJECTION, SOLUTION INTRAVENOUS at 12:04

## 2019-04-26 RX ADMIN — KETOROLAC TROMETHAMINE 15 MG: 30 INJECTION, SOLUTION INTRAMUSCULAR at 03:04

## 2019-04-26 RX ADMIN — PIPERACILLIN SODIUM AND TAZOBACTAM SODIUM 4.5 G: 4; .5 INJECTION, POWDER, LYOPHILIZED, FOR SOLUTION INTRAVENOUS at 08:04

## 2019-04-26 NOTE — NURSING
Situation-   Who are you? Who is the patient? What is going on with the patient currently?         I am Robyn Fuller calling ***  from [facility]  in regards to Jacky Ray who is a 44 y.o. year old male admitted with Acute pyelonephritis who.had lithotripsy and stent placed yesterday evening. Toradol 15 mg q 6hrs ordered. Says his pain is 9 or 10 when he moves or pees. Wants more medicine please.   Background- What is the patient's significant medical history (CAD, ESRD, HIV positive, history of recent surgery/chemo.transfusion) and recent labs Has a past medical history of   Past Medical History:   Diagnosis Date    Renal disorder     KIDNEY STONES   . Most recent vitals include           Assessment-   Has the following issues (abnormal labs, abnormal vitals, change in status, uncontrolled symptoms, patient request) Who is having     Recommendation- What is the change in the plan of care requested? Do you think we should- . If the following plan doesn't succeed, we     Plan-  What did we decide to do?     Plan was discussed and is as follows ***

## 2019-04-26 NOTE — PLAN OF CARE
Problem: Adult Inpatient Plan of Care  Goal: Plan of Care Review  AOX4. IV site wnl. Voiding reddish pink urine. Strict I&O in progress. Ambulating to bathroom independently. Called to get more medication for complaints of pain 10 on scale. Pleasant. Says he is farting through his penis.V.S. Wnl. Slept in between care.

## 2019-04-26 NOTE — PLAN OF CARE
Spoke with Janay at [facility] (ph#[phone], fax#[phone]) regarding the pt's need for a FU appt; faxed her the referral and they will contact the pt with an appt....AL Meier       04/26/19 1007   Discharge Reassessment   Assessment Type Discharge Planning Reassessment

## 2019-04-26 NOTE — NURSING
Patient sitting at bedside eating dinner. Complains of testicle pain, pain mgmt plan reviewed with patient, verbalized understanding.

## 2019-04-26 NOTE — DISCHARGE SUMMARY
"[facility] Hospital Medicine  Discharge Summary      Patient Name: Jacky Ray  MRN: [id number]  Admission Date: 4/23/2019  Hospital Length of Stay: 3 days  Discharge Date and Time:  04/26/2019 12:59 PM  Attending Physician: No att. providers found   Discharging Provider: Cindi Recio MD  Primary Care Provider: Primary Doctor Zara      HPI:     Jacky Ray is a 44 y.o. male with renal disorder who presents to the ED with an onset of lower abdominal pain with associated fever and chills. He reports having a kidney stone and states he feels as if he has "sepsis." The patient was seen at [facility] in [place], [place], admitted for sepsis, transferred to [facility] for a cystoscopy with stent placement performed by Dr. Hernando Hernandez ~3.5 weeks ago on 3/28 but left against medical advice.  The patient is a daily cigarette smoker. He denies any other symptoms at this time. No additional pertinent PSHx noted. Tetanus vaccination and toxoid drug allergy noted    Patient is somewhat groggy after pain medication when I saw him and reports he has had left flank pain for 2 weeks but did not seek medical attention.  He reports that the fever was which concerned him to come in today.  He has had no nausea vomiting or diarrhea.  Has had severe dysuria.  Denies hematuria.          Procedure(s) (LRB):  REMOVAL, CALCULUS, URETER (Left)  CYSTOURETEROSCOPY, WITH RETROGRADE PYELOGRAM AND URETERAL STENT INSERTION (Left)  LITHOTRIPSY, USING LASER (Left)  REMOVAL, STENT, URETER (Left)      Hospital Course:   Jacky Ray is a 44 y.o. male with hx kidney stone s/p stent one month ago and pyelo who presents to the ED with an onset of lower abdominal pain with associated fever and chills. --noted to have pyelo, retained stent and 11 mm stone left renal pelvis. He did not follow up after last admit.  Hydronephrosis was resolved. Blood/urine cultures obtained, initiated on vanc/zosyn " and urology was consulted.   4/25-stone lasered and removed and stent replaced with stent on string so he can remove himself. Urine positive for klebsiella which is sensitive to Cipro.    The patient is doing much better will be discharged home today with a prescription for Cipro.  He has been instructed to pull the stent out on Monday.  We are working on getting him a urology  Appointment in [place].       Consults:   Consults (From admission, onward)        Status Ordering Provider     Inpatient consult to Urology  Once     Provider:  Larry Stephens MD    Completed LARRY STEPHENS            Final Active Diagnoses:    Diagnosis Date Noted POA    PRINCIPAL PROBLEM:  Acute pyelonephritis [N10] 03/28/2019 Yes    Kidney stone on left side [N20.0] 04/23/2019 Yes    Methamphetamine use [F15.10] 03/30/2019 Yes    Hyponatremia [E87.1] 03/28/2019 Yes    Nicotine dependence [F17.200] 03/28/2019 Yes      Problems Resolved During this Admission:       Discharged Condition: stable    Disposition: Home or Self Care    Follow Up:  Follow-up Information     [facility] .    Why:  they will contact you with an appointment   Contact information:  [phone]               Patient Instructions:      Diet Adult Regular     Activity as tolerated       Significant Diagnostic Studies: Labs:   BMP:   Recent Labs   Lab 04/25/19  0700   GLU 98      K 4.5      CO2 27   BUN 8   CREATININE 1.0   CALCIUM 9.4    and CBC   Recent Labs   Lab 04/25/19  0700   WBC 7.70   HGB 13.3*   HCT 40.3          Pending Diagnostic Studies:     None         Medications:  Reconciled Home Medications:      Medication List      START taking these medications    ciprofloxacin HCl 500 MG tablet  Commonly known as:  CIPRO  Take 1 tablet (500 mg total) by mouth every 12 (twelve) hours. for 12 days     oxyCODONE-acetaminophen 5-325 mg per tablet  Commonly known as:  PERCOCET  Take 1 tablet by mouth every 6 (six) hours as  needed for Pain.     tamsulosin 0.4 mg Cap  Commonly known as:  FLOMAX  Take 1 capsule (0.4 mg total) by mouth every evening. for 7 days            Indwelling Lines/Drains at time of discharge:   Lines/Drains/Airways     Drain                 Ureteral Drain/Stent 04/25/19 1650 Left ureter 6 Fr. less than 1 day                Time spent on the discharge of patient: 30 minutes  Patient was seen and examined on the date of discharge and determined to be suitable for discharge.         Cindi Recio MD  Department of Hospital Medicine  [facility]

## 2019-04-27 NOTE — PLAN OF CARE
04/27/19 1149   Final Note   Assessment Type Final Discharge Note   Anticipated Discharge Disposition Home

## 2019-04-28 LAB
BACTERIA BLD CULT: NORMAL
BACTERIA BLD CULT: NORMAL

## 2019-04-28 NOTE — OP NOTE
[facility] Operative Report     Date: 04/25/2019     Staff Surgeon: Hernando Hernandez MD     Pre-Op Diagnosis:   Left renal calculus  History of urosepsis 2/2 renal/proximal ureteral calculus obstruction     Post-Op Diagnosis: same     Procedure(s) Performed:   Left nephroureteroscopy with holmium laser lithotripsy and stone basket extraction  Cystoscopy with exchange of leftt ureteral double J stent [id number], now on a string  Left retrograde pyelogram  Flouroscopy < 1 hour  Interpretation of flouroscopic images     Specimen(s):   Stone for chemical analysis     Anesthesia: General endotracheal anesthesia     Findings: 1.2cm renal calculus, dusted to basektable fragment, removed     Estimated Blood Loss: minimal     Drains: ureteral stent as above     Complications: none     Indications for procedure:  44-year-old male who presented as a transfer from [facility] 1 month ago with urosepsis secondary to obstructing large left renal/proximal ureteral calculus.  Stent placed.  Once Diaz catheter was removed 2 days later he left AMA and returned this week.  He has undergone 48 hr of IV antibiotics, has been afebrile, has no lab abnormalities, and admit urine culture demonstrates decreased colony count of his previous Klebsiella UTI.  A urine Gram stain this morning has no organisms.  As he is optimized in the hospital setting with IV antibiotics and clearance of infection, discussed with the patient definitive stone management with ureteroscopic stone extraction and exchange of the stent, placed on a string, so he is not have retained stent risk in the future.  All questions answered and appropriate informed consent obtained.     Procedure in detail:  After appropriate informed consent was obtained, the patient was taken to the operating room and placed in the lithotomy position. Preop antibiotics were given, vancomycin and Zosyn as per his current regimen. WHO-approved time-out was performed. He was prepped and  draped in the standard cystoscopic fashion.     A rigid cystoscope in a 21 Fr sheath was used to enter the patient's bladder via the urethra.  Anterior urethra normal.  Prostatic urethra demonstrated mild ingrowth of bilateral lateral lobes.  Distal curl of ureteral stent seen emanating from left ureteral orifice.      Cook motion guidewire was passed through the scope into the leftt ureter alongside the stent and seen to curl proximally in collecting system.  Cystoscope was off-loaded and passed back into the bladder alongside the guidewire and a 2 prong grasper was used to remove the indwelling ureteral stent.  A 10 [place] dual lumen catheter was passed in over the guidewire under fluoroscopic guidance until it was seen in the mid ureter at which time a superstiff guidewire was passed through the 2nd port until it was seen to curl proximally in the collecting system.  The 10 [place] dual-lumen catheter was off-loaded and over the superstiff guidewire, a 45cm 12-14 Hungarian ureteral access sheath was passed under live fluoroscopic guidance into the proximal ureter.  The guidewire and stylet were then discontinued and a digital flexible ureteroscope was passed into the proximal ureter and into the collecting system.     Systematic nephroscopy was performed and the wire was curled in the upper pole.  Nephroscopy performed with the flexible ureteroscope and localized the stone in the renal pelvis which could be seen as a radio opacity approximately 1.2 cm on fluoroscopy corresponding to direct visualized stone.      At this time, a 275 micron holmium laser fiber was passed through the ureteroscope and used to perform laser lithotripsy of the stone using dusting technique was used to methodically dust the edges of the stone until made into a smaller stone fragment to avoid significant amount of fragments within the collecting system.   Once the stone was small enough it was basket extracted with tipless Nitinol stone  basket.  Radiodensity cleared on fluoroscopy and collecting systems systematically inspected and found only dust and small passable stone fragments the diameter of the wire or smaller.    The ureteroscope was withdrawn into the proximal ureter, and dilute Isovue contrast was injected in a retrograde fashion to perform retrograde pyelogram finding no filling defects.       After confirming the guidewire still was in place under flouro with proximal curl in the kidney, it was backloaded through the cystoscope and under direct cystoscopic vision at the left ureteral orifice, a 6 [place] x 24cm firm double-J ureteral stent was then placed over the wire and into the left ureter using flouroscopic guidance to confirm its proximal curl in the kidney as per contrast retention and direct vision with the cystoscope to confirm distal curl in the bladder.  String was left on the stent and secured to penile shaft with Mastisol and Tegaderm.  Bladder was drained via the cystoscope sheath      The patient tolerated the procedure well, there were no complications. He was awakened and taken to PACU without incident      Disposition:   Return to floor fo continued IVF and IV abx and care as per hospital medicine  Ucx with lower colony count of Klebsiella  DC with po abx once sensitivities back tomorrow (or if meets dc criteria, can go by sensitivities from last admit)  Give 1 week flomax  Instruct to remove ureteral stent via urethral string on Monday morning 4/29/19 by 0900  Further follow up with [facility] (or if can get [place] Medicaid arranged, can f/u with [facility])  All recs discussed with Dr Ridley, [facility] medicine

## 2019-04-29 ENCOUNTER — PATIENT OUTREACH (OUTPATIENT)
Dept: ADMINISTRATIVE | Facility: CLINIC | Age: 44
End: 2019-04-29

## 2019-04-29 NOTE — PATIENT INSTRUCTIONS
Discharge Instructions for Pyelonephritis  You have been told you have a kidney infection. This is called pyelonephritis. The infection can be serious. It can damage your kidneys and cause bacteria to enter your bloodstream. You were treated in the hospital. Once you return home, heres what you can do at home to your recovery and prevent future infections.  Home Care   Take all the medication you were prescribed, even if you feel better. Not finishing the medication can make the infection come back. It may also make a future infection harder to treat.  Unless told not to by your healthcare provider, drink 8 to 12 glasses of fluid every day. Clear fluids, such as water, are best. This may help flush the infection from your system.  Preventing Future Infection   Keep your genital area clean. Use mild soap. Rinse with water.  If you are a woman, always wipe the genital area from front to back.  Urinate frequently. Avoid holding urine in the bladder for a long time.  Always urinate after sexual intercourse.  Follow-Up   Make a follow-up appointment. And see your doctor for regular lab tests as directed. Our staff can help you with this.    When to Call Your Doctor  Call your doctor right away if you have any of the following:  Decreased urine output or trouble urinating  Severe pain in the lower back or flank  Fever above 100.4 °F or shaking chills  Vomiting  Blood in your urine  Dark-colored or foul-smelling urine  Nausea or other problems that prevent you from taking your prescribed medication   © 3068-4963 [employer], [address] [place] [address], [place] [address]. All rights reserved. This information is not intended as a substitute for professional medical care. Always follow your healthcare professional's instructions.

## 2019-05-02 LAB
ANNOTATION COMMENT IMP: NORMAL
COMPN STONE: NORMAL
SPECIMEN SOURCE: NORMAL
STONE ANALYSIS IR-IMP: NORMAL

## 2020-12-30 PROCEDURE — 96375 TX/PRO/DX INJ NEW DRUG ADDON: CPT

## 2020-12-30 PROCEDURE — 96361 HYDRATE IV INFUSION ADD-ON: CPT

## 2020-12-30 PROCEDURE — 96374 THER/PROPH/DIAG INJ IV PUSH: CPT

## 2020-12-30 PROCEDURE — 99285 EMERGENCY DEPT VISIT HI MDM: CPT | Mod: 25

## 2020-12-31 ENCOUNTER — HOSPITAL ENCOUNTER (EMERGENCY)
Facility: HOSPITAL | Age: 45
Discharge: HOME | End: 2020-12-31
Attending: EMERGENCY MEDICINE
Payer: OTHER GOVERNMENT

## 2020-12-31 VITALS
HEART RATE: 93 BPM | DIASTOLIC BLOOD PRESSURE: 73 MMHG | HEIGHT: 64 IN | WEIGHT: 145 LBS | TEMPERATURE: 98 F | BODY MASS INDEX: 24.75 KG/M2 | OXYGEN SATURATION: 98 % | SYSTOLIC BLOOD PRESSURE: 125 MMHG

## 2020-12-31 DIAGNOSIS — R11.2 NAUSEA AND VOMITING, INTRACTABILITY OF VOMITING NOT SPECIFIED, UNSPECIFIED VOMITING TYPE: ICD-10-CM

## 2020-12-31 DIAGNOSIS — R07.9 CHEST PAIN: Primary | ICD-10-CM

## 2020-12-31 LAB
ALBUMIN SERPL BCP-MCNC: 4.1 G/DL (ref 3.5–5.2)
ALP SERPL-CCNC: 79 U/L (ref 55–135)
ALT SERPL W/O P-5'-P-CCNC: 93 U/L (ref 10–44)
ANION GAP SERPL CALC-SCNC: 13 MMOL/L (ref 8–16)
AST SERPL-CCNC: 30 U/L (ref 10–40)
BASOPHILS # BLD AUTO: 0.07 K/UL (ref 0–0.2)
BASOPHILS NFR BLD: 0.7 % (ref 0–1.9)
BILIRUB SERPL-MCNC: 0.4 MG/DL (ref 0.1–1)
BUN SERPL-MCNC: 12 MG/DL (ref 6–20)
CALCIUM SERPL-MCNC: 9.4 MG/DL (ref 8.7–10.5)
CHLORIDE SERPL-SCNC: 98 MMOL/L (ref 95–110)
CO2 SERPL-SCNC: 24 MMOL/L (ref 23–29)
CREAT SERPL-MCNC: 1.2 MG/DL (ref 0.5–1.4)
DIFFERENTIAL METHOD: ABNORMAL
EOSINOPHIL # BLD AUTO: 0.2 K/UL (ref 0–0.5)
EOSINOPHIL NFR BLD: 2.3 % (ref 0–8)
ERYTHROCYTE [DISTWIDTH] IN BLOOD BY AUTOMATED COUNT: 13.2 % (ref 11.5–14.5)
EST. GFR  (AFRICAN AMERICAN): >60 ML/MIN/1.73 M^2
EST. GFR  (NON AFRICAN AMERICAN): >60 ML/MIN/1.73 M^2
GLUCOSE SERPL-MCNC: 134 MG/DL (ref 70–110)
HCT VFR BLD AUTO: 48.7 % (ref 40–54)
HGB BLD-MCNC: 16.4 G/DL (ref 14–18)
IMM GRANULOCYTES # BLD AUTO: 0.12 K/UL (ref 0–0.04)
IMM GRANULOCYTES NFR BLD AUTO: 1.2 % (ref 0–0.5)
LYMPHOCYTES # BLD AUTO: 4 K/UL (ref 1–4.8)
LYMPHOCYTES NFR BLD: 40.2 % (ref 18–48)
MCH RBC QN AUTO: 30.4 PG (ref 27–31)
MCHC RBC AUTO-ENTMCNC: 33.7 G/DL (ref 32–36)
MCV RBC AUTO: 90 FL (ref 82–98)
MONOCYTES # BLD AUTO: 0.6 K/UL (ref 0.3–1)
MONOCYTES NFR BLD: 6.4 % (ref 4–15)
NEUTROPHILS # BLD AUTO: 5 K/UL (ref 1.8–7.7)
NEUTROPHILS NFR BLD: 49.2 % (ref 38–73)
NRBC BLD-RTO: 0 /100 WBC
PLATELET # BLD AUTO: 371 K/UL (ref 150–350)
PMV BLD AUTO: 9.3 FL (ref 9.2–12.9)
POTASSIUM SERPL-SCNC: 3.2 MMOL/L (ref 3.5–5.1)
PROT SERPL-MCNC: 7.8 G/DL (ref 6–8.4)
RBC # BLD AUTO: 5.4 M/UL (ref 4.6–6.2)
SARS-COV-2 RDRP RESP QL NAA+PROBE: NEGATIVE
SODIUM SERPL-SCNC: 135 MMOL/L (ref 136–145)
TROPONIN I SERPL DL<=0.01 NG/ML-MCNC: <0.01 NG/ML (ref 0.02–0.5)
WBC # BLD AUTO: 10.05 K/UL (ref 3.9–12.7)

## 2020-12-31 PROCEDURE — 85025 COMPLETE CBC W/AUTO DIFF WBC: CPT

## 2020-12-31 PROCEDURE — C9113 INJ PANTOPRAZOLE SODIUM, VIA: HCPCS | Performed by: EMERGENCY MEDICINE

## 2020-12-31 PROCEDURE — 63600175 PHARM REV CODE 636 W HCPCS: Performed by: EMERGENCY MEDICINE

## 2020-12-31 PROCEDURE — 25000003 PHARM REV CODE 250: Performed by: EMERGENCY MEDICINE

## 2020-12-31 PROCEDURE — 93005 ELECTROCARDIOGRAM TRACING: CPT

## 2020-12-31 PROCEDURE — 84484 ASSAY OF TROPONIN QUANT: CPT

## 2020-12-31 PROCEDURE — U0002 COVID-19 LAB TEST NON-CDC: HCPCS

## 2020-12-31 PROCEDURE — 80053 COMPREHEN METABOLIC PANEL: CPT

## 2020-12-31 RX ORDER — PANTOPRAZOLE SODIUM 40 MG/10ML
40 INJECTION, POWDER, LYOPHILIZED, FOR SOLUTION INTRAVENOUS
Status: COMPLETED | OUTPATIENT
Start: 2020-12-31 | End: 2020-12-31

## 2020-12-31 RX ORDER — ONDANSETRON 2 MG/ML
4 INJECTION INTRAMUSCULAR; INTRAVENOUS
Status: COMPLETED | OUTPATIENT
Start: 2020-12-31 | End: 2020-12-31

## 2020-12-31 RX ORDER — OMEPRAZOLE 20 MG/1
20 CAPSULE, DELAYED RELEASE ORAL DAILY
Qty: 10 CAPSULE | Refills: 0 | Status: SHIPPED | OUTPATIENT
Start: 2020-12-31 | End: 2021-01-10

## 2020-12-31 RX ORDER — ONDANSETRON 4 MG/1
4 TABLET, ORALLY DISINTEGRATING ORAL EVERY 8 HOURS PRN
Qty: 12 TABLET | Refills: 0 | Status: SHIPPED | OUTPATIENT
Start: 2020-12-31

## 2020-12-31 RX ADMIN — ONDANSETRON HYDROCHLORIDE 4 MG: 2 SOLUTION INTRAMUSCULAR; INTRAVENOUS at 12:12

## 2020-12-31 RX ADMIN — SODIUM CHLORIDE 1000 ML: 0.9 INJECTION, SOLUTION INTRAVENOUS at 12:12

## 2020-12-31 RX ADMIN — LIDOCAINE HYDROCHLORIDE: 20 SOLUTION ORAL; TOPICAL at 12:12

## 2020-12-31 RX ADMIN — PANTOPRAZOLE SODIUM 40 MG: 40 INJECTION, POWDER, FOR SOLUTION INTRAVENOUS at 12:12

## (undated) DEVICE — GUIDE WIRE MOTION .035 X 150CM

## (undated) DEVICE — SOL IRR NACL .9% 3000ML

## (undated) DEVICE — SYR ONLY LUER LOCK 20CC

## (undated) DEVICE — GLOVE SURG ULTRA TOUCH 7

## (undated) DEVICE — SEE MEDLINE ITEM 157185

## (undated) DEVICE — SLEEVE SCD EXPRESS CALF MEDIUM

## (undated) DEVICE — SPONGE SUPER KERLIX 6X6.75IN

## (undated) DEVICE — SET IRR URLGY 2LINE UNIV SPIKE

## (undated) DEVICE — SCRUB 10% POVIDONE IODINE 4OZ

## (undated) DEVICE — TRAY DRY SPONGE SCRUB W FOAM

## (undated) DEVICE — FIBER LASER HOLMIUM 273 MICRON

## (undated) DEVICE — SEE MEDLINE ITEM 152487

## (undated) DEVICE — GLOVE PROTEXIS PI CLASSIC 7.0

## (undated) DEVICE — GLOVE 7.0 PROTEXIS PI BLUE

## (undated) DEVICE — EXTRACTOR STONE 4WR NIT 2.2F 1

## (undated) DEVICE — LUBRICANT SURGILUBE 2 OZ

## (undated) DEVICE — SOL WATER STRL IRR 1000ML

## (undated) DEVICE — SOL PVP-I SCRUB 7.5% 4OZ

## (undated) DEVICE — GLOVE 7.5 PROTEXIS PI ORTHO PF

## (undated) DEVICE — BAG LINGEMAN URO DRAIN HOSE

## (undated) DEVICE — SHEATH URET FLEXOR 12FR 45CM

## (undated) DEVICE — GOWN X-LG STERILE BACK

## (undated) DEVICE — CATH POLLACK OPEN-END FLEXI-TI

## (undated) DEVICE — GUIDEWIRE AMPLATZ .035X145 STR

## (undated) DEVICE — KIT CATH SURESTEP 350ML URIN18

## (undated) DEVICE — SEE MEDLINE ITEM 157117

## (undated) DEVICE — DRESSING TRANS 2X2 TEGADERM

## (undated) DEVICE — CATH URETERAL DUAL LUMEN 10FR

## (undated) DEVICE — SPRAY MASTISOL

## (undated) DEVICE — SYS EASY CATCHER FLUID DRN

## (undated) DEVICE — SOL 9P NACL IRR PIC IL